# Patient Record
Sex: MALE | Race: WHITE | NOT HISPANIC OR LATINO | URBAN - METROPOLITAN AREA
[De-identification: names, ages, dates, MRNs, and addresses within clinical notes are randomized per-mention and may not be internally consistent; named-entity substitution may affect disease eponyms.]

---

## 2017-03-28 ENCOUNTER — OUTPATIENT (OUTPATIENT)
Dept: OUTPATIENT SERVICES | Facility: HOSPITAL | Age: 77
LOS: 1 days | End: 2017-03-28
Payer: MEDICARE

## 2017-03-28 VITALS
DIASTOLIC BLOOD PRESSURE: 74 MMHG | HEART RATE: 67 BPM | TEMPERATURE: 98 F | SYSTOLIC BLOOD PRESSURE: 139 MMHG | OXYGEN SATURATION: 96 % | RESPIRATION RATE: 18 BRPM

## 2017-03-28 VITALS — HEIGHT: 71 IN | WEIGHT: 199.08 LBS

## 2017-03-28 DIAGNOSIS — Z95.1 PRESENCE OF AORTOCORONARY BYPASS GRAFT: Chronic | ICD-10-CM

## 2017-03-28 DIAGNOSIS — I25.10 ATHEROSCLEROTIC HEART DISEASE OF NATIVE CORONARY ARTERY WITHOUT ANGINA PECTORIS: Chronic | ICD-10-CM

## 2017-03-28 DIAGNOSIS — Z01.810 ENCOUNTER FOR PREPROCEDURAL CARDIOVASCULAR EXAMINATION: ICD-10-CM

## 2017-03-28 LAB
ALBUMIN SERPL ELPH-MCNC: 3.8 G/DL — SIGNIFICANT CHANGE UP (ref 3.3–5.2)
ALP SERPL-CCNC: 62 U/L — SIGNIFICANT CHANGE UP (ref 40–120)
ALT FLD-CCNC: 10 U/L — SIGNIFICANT CHANGE UP
ANION GAP SERPL CALC-SCNC: 11 MMOL/L — SIGNIFICANT CHANGE UP (ref 5–17)
APTT BLD: 33.8 SEC — SIGNIFICANT CHANGE UP (ref 27.5–37.4)
AST SERPL-CCNC: 12 U/L — SIGNIFICANT CHANGE UP
BASOPHILS # BLD AUTO: 0 K/UL — SIGNIFICANT CHANGE UP (ref 0–0.2)
BASOPHILS NFR BLD AUTO: 0.1 % — SIGNIFICANT CHANGE UP (ref 0–2)
BILIRUB SERPL-MCNC: 0.4 MG/DL — SIGNIFICANT CHANGE UP (ref 0.4–2)
BUN SERPL-MCNC: 18 MG/DL — SIGNIFICANT CHANGE UP (ref 8–20)
CALCIUM SERPL-MCNC: 9 MG/DL — SIGNIFICANT CHANGE UP (ref 8.6–10.2)
CHLORIDE SERPL-SCNC: 102 MMOL/L — SIGNIFICANT CHANGE UP (ref 98–107)
CO2 SERPL-SCNC: 26 MMOL/L — SIGNIFICANT CHANGE UP (ref 22–29)
CREAT SERPL-MCNC: 1.25 MG/DL — SIGNIFICANT CHANGE UP (ref 0.5–1.3)
EOSINOPHIL # BLD AUTO: 0.2 K/UL — SIGNIFICANT CHANGE UP (ref 0–0.5)
EOSINOPHIL NFR BLD AUTO: 3.6 % — SIGNIFICANT CHANGE UP (ref 0–5)
GLUCOSE SERPL-MCNC: 81 MG/DL — SIGNIFICANT CHANGE UP (ref 70–115)
HCT VFR BLD CALC: 36.8 % — LOW (ref 42–52)
HGB BLD-MCNC: 11.1 G/DL — LOW (ref 14–18)
INR BLD: 1.04 RATIO — SIGNIFICANT CHANGE UP (ref 0.88–1.16)
LYMPHOCYTES # BLD AUTO: 2.4 K/UL — SIGNIFICANT CHANGE UP (ref 1–4.8)
LYMPHOCYTES # BLD AUTO: 34 % — SIGNIFICANT CHANGE UP (ref 20–55)
MAGNESIUM SERPL-MCNC: 1.8 MG/DL — SIGNIFICANT CHANGE UP (ref 1.8–2.5)
MCHC RBC-ENTMCNC: 23.5 PG — LOW (ref 27–31)
MCHC RBC-ENTMCNC: 30.2 G/DL — LOW (ref 32–36)
MCV RBC AUTO: 78 FL — LOW (ref 80–94)
MONOCYTES # BLD AUTO: 0.5 K/UL — SIGNIFICANT CHANGE UP (ref 0–0.8)
MONOCYTES NFR BLD AUTO: 7.8 % — SIGNIFICANT CHANGE UP (ref 3–10)
NEUTROPHILS # BLD AUTO: 3.8 K/UL — SIGNIFICANT CHANGE UP (ref 1.8–8)
NEUTROPHILS NFR BLD AUTO: 54.2 % — SIGNIFICANT CHANGE UP (ref 37–73)
PLATELET # BLD AUTO: 249 K/UL — SIGNIFICANT CHANGE UP (ref 150–400)
POTASSIUM SERPL-MCNC: 3.6 MMOL/L — SIGNIFICANT CHANGE UP (ref 3.5–5.3)
POTASSIUM SERPL-SCNC: 3.6 MMOL/L — SIGNIFICANT CHANGE UP (ref 3.5–5.3)
PROT SERPL-MCNC: 7.3 G/DL — SIGNIFICANT CHANGE UP (ref 6.6–8.7)
PROTHROM AB SERPL-ACNC: 11.5 SEC — SIGNIFICANT CHANGE UP (ref 9.8–12.7)
RBC # BLD: 4.72 M/UL — SIGNIFICANT CHANGE UP (ref 4.6–6.2)
RBC # FLD: 16.3 % — HIGH (ref 11–15.6)
SODIUM SERPL-SCNC: 139 MMOL/L — SIGNIFICANT CHANGE UP (ref 135–145)
WBC # BLD: 7 K/UL — SIGNIFICANT CHANGE UP (ref 4.8–10.8)
WBC # FLD AUTO: 7 K/UL — SIGNIFICANT CHANGE UP (ref 4.8–10.8)

## 2017-03-28 PROCEDURE — 93010 ELECTROCARDIOGRAM REPORT: CPT

## 2017-03-28 NOTE — H&P PST ADULT - PSH
CAD S/P percutaneous coronary angioplasty    S/P CABG x 3  [facility] sept. 2016 ENG LAD, SVG to circ, SVG to RPDA

## 2017-03-28 NOTE — H&P PST ADULT - ASSESSMENT
77 y/o white male s/p CABG/PCIs now with abnormal NST following new onset SOB.  Multiple risk factors for CV disease

## 2017-03-28 NOTE — ASU PATIENT PROFILE, ADULT - PSH
CAD S/P percutaneous coronary angioplasty CAD S/P percutaneous coronary angioplasty    S/P CABG x 3  [facility] sept. 2016

## 2017-03-28 NOTE — ASU PATIENT PROFILE, ADULT - PMH
Coronary artery disease involving native coronary artery of native heart with angina pectoris    Essential hypertension    Pure hypercholesterolemia Coronary artery disease involving native coronary artery of native heart with angina pectoris    Essential hypertension    Old MI (myocardial infarction)    Pure hypercholesterolemia

## 2017-03-28 NOTE — H&P PST ADULT - LAST STRESS TEST
March 2017  new anterolateral ischemia with small sized, moderate intensity completely reversible defect of the mid anterolateral apical lateral and apical septal wall consistent with ischemia.  There was a small sized moderate intensity fixed defect of the basal inferior basal inferloater and mid inferior wall consistent with MI.  EF 63%. Had PVCs and 3 beat SVT during.

## 2017-03-28 NOTE — H&P PST ADULT - NSANTHOSAYNRD_GEN_A_CORE
No. XIOMARA screening performed.  STOP BANG Legend: 0-2 = LOW Risk; 3-4 = INTERMEDIATE Risk; 5-8 = HIGH Risk

## 2017-03-28 NOTE — H&P PST ADULT - PMH
BPH (benign prostatic hyperplasia)    Congenital kidney disease    Coronary artery disease involving native coronary artery of native heart with angina pectoris  CABG 2016 ENG LAD, SVG TO CIRC, SVG TO RPDA  PCIs  Essential hypertension    Former smoker    Hepatic cyst    Old MI (myocardial infarction)    PAF (paroxysmal atrial fibrillation)    Pure hypercholesterolemia    SOB (shortness of breath)  ANGINAL EQUIVALENT CLASS III

## 2017-03-28 NOTE — H&P PST ADULT - HISTORY OF PRESENT ILLNESS
76 y /o white male presents to [facility] for [facility] to evaluate recent NST following evaluation for recent SOB/fatigue    3/2017 NST showed new anterolateral ischemia with small sized, moderate intensity completely reversible defect of the mid anterolateral apical lateral and apical septal wall consistent with ischemia.  There was a small sized moderate intensity fixed defect of the basal inferior basal inferloater and mid inferior wall consistent with MI.  EF 63%. Had PVCs and 3 beat SVT during.    PMH: CABG 8/2016 LIMA to LAD , SVG to circumflex marginal SVG to RPDA @ [facility]            MI, former smoker, PCIs, BPH, HLD, congenital kidney disorder (pt unusure does not see nephrology), PAF    PCP: Elba  Cardiac: Tadeo/Janna/Brandy 76 y /o white male presents to [facility] for [facility] to evaluate recent NST following evaluation for recent SOB/fatigue    3/2017 NST showed new anterolateral ischemia with small sized, moderate intensity completely reversible defect of the mid anterolateral apical lateral and apical septal wall consistent with ischemia.  There was a small sized moderate intensity fixed defect of the basal inferior basal inferloater and mid inferior wall consistent with MI.  EF 63%. Had PVCs and 3 beat SVT during. Intermediate risk NST.    PMH: CABG 8/2016 LIMA to LAD , SVG to circumflex marginal SVG to RPDA @ [facility]            MI, former smoker, PCIs, BPH, HLD, congenital kidney disorder (pt unusure does not see nephrology), PAF    PCP: Elba  Cardiac: Tadeo/Janna/Brandy

## 2017-03-29 ENCOUNTER — INPATIENT (INPATIENT)
Facility: HOSPITAL | Age: 77
LOS: 0 days | Discharge: HOME | DRG: 247 | End: 2017-03-30
Attending: INTERNAL MEDICINE | Admitting: INTERNAL MEDICINE
Payer: COMMERCIAL

## 2017-03-29 VITALS
SYSTOLIC BLOOD PRESSURE: 163 MMHG | DIASTOLIC BLOOD PRESSURE: 68 MMHG | HEART RATE: 62 BPM | OXYGEN SATURATION: 98 % | RESPIRATION RATE: 14 BRPM

## 2017-03-29 DIAGNOSIS — Z95.1 PRESENCE OF AORTOCORONARY BYPASS GRAFT: Chronic | ICD-10-CM

## 2017-03-29 DIAGNOSIS — I25.10 ATHEROSCLEROTIC HEART DISEASE OF NATIVE CORONARY ARTERY WITHOUT ANGINA PECTORIS: ICD-10-CM

## 2017-03-29 DIAGNOSIS — Z01.810 ENCOUNTER FOR PREPROCEDURAL CARDIOVASCULAR EXAMINATION: ICD-10-CM

## 2017-03-29 DIAGNOSIS — R94.39 ABNORMAL RESULT OF OTHER CARDIOVASCULAR FUNCTION STUDY: ICD-10-CM

## 2017-03-29 DIAGNOSIS — I25.10 ATHEROSCLEROTIC HEART DISEASE OF NATIVE CORONARY ARTERY WITHOUT ANGINA PECTORIS: Chronic | ICD-10-CM

## 2017-03-29 PROCEDURE — 92928 PRQ TCAT PLMT NTRAC ST 1 LES: CPT | Mod: LM

## 2017-03-29 PROCEDURE — 92978 ENDOLUMINL IVUS OCT C 1ST: CPT | Mod: 26

## 2017-03-29 PROCEDURE — 93459 L HRT ART/GRFT ANGIO: CPT | Mod: 26,XU

## 2017-03-29 PROCEDURE — 93567 NJX CAR CTH SPRVLV AORTGRPHY: CPT

## 2017-03-29 RX ORDER — CLOPIDOGREL BISULFATE 75 MG/1
75 TABLET, FILM COATED ORAL DAILY
Qty: 0 | Refills: 0 | Status: DISCONTINUED | OUTPATIENT
Start: 2017-03-31 | End: 2017-03-30

## 2017-03-29 RX ORDER — HYDRALAZINE HCL 50 MG
5 TABLET ORAL ONCE
Qty: 0 | Refills: 0 | Status: COMPLETED | OUTPATIENT
Start: 2017-03-29 | End: 2017-03-29

## 2017-03-29 RX ORDER — METOPROLOL TARTRATE 50 MG
50 TABLET ORAL
Qty: 0 | Refills: 0 | Status: DISCONTINUED | OUTPATIENT
Start: 2017-03-29 | End: 2017-03-30

## 2017-03-29 RX ORDER — CLOPIDOGREL BISULFATE 75 MG/1
600 TABLET, FILM COATED ORAL ONCE
Qty: 0 | Refills: 0 | Status: COMPLETED | OUTPATIENT
Start: 2017-03-30 | End: 2017-03-30

## 2017-03-29 RX ORDER — FENTANYL CITRATE 50 UG/ML
25 INJECTION INTRAVENOUS ONCE
Qty: 0 | Refills: 0 | Status: DISCONTINUED | OUTPATIENT
Start: 2017-03-29 | End: 2017-03-29

## 2017-03-29 RX ORDER — SODIUM CHLORIDE 9 MG/ML
500 INJECTION INTRAMUSCULAR; INTRAVENOUS; SUBCUTANEOUS
Qty: 0 | Refills: 0 | Status: DISCONTINUED | OUTPATIENT
Start: 2017-03-29 | End: 2017-03-29

## 2017-03-29 RX ORDER — ALPRAZOLAM 0.25 MG
0.25 TABLET ORAL EVERY 6 HOURS
Qty: 0 | Refills: 0 | Status: DISCONTINUED | OUTPATIENT
Start: 2017-03-29 | End: 2017-03-30

## 2017-03-29 RX ORDER — ACETAMINOPHEN 500 MG
650 TABLET ORAL EVERY 6 HOURS
Qty: 0 | Refills: 0 | Status: DISCONTINUED | OUTPATIENT
Start: 2017-03-29 | End: 2017-03-30

## 2017-03-29 RX ORDER — ONDANSETRON 8 MG/1
4 TABLET, FILM COATED ORAL EVERY 8 HOURS
Qty: 0 | Refills: 0 | Status: DISCONTINUED | OUTPATIENT
Start: 2017-03-29 | End: 2017-03-30

## 2017-03-29 RX ORDER — ASPIRIN/CALCIUM CARB/MAGNESIUM 324 MG
81 TABLET ORAL DAILY
Qty: 0 | Refills: 0 | Status: DISCONTINUED | OUTPATIENT
Start: 2017-03-29 | End: 2017-03-30

## 2017-03-29 RX ORDER — FUROSEMIDE 40 MG
20 TABLET ORAL ONCE
Qty: 0 | Refills: 0 | Status: COMPLETED | OUTPATIENT
Start: 2017-03-29 | End: 2017-03-29

## 2017-03-29 RX ORDER — SODIUM CHLORIDE 9 MG/ML
500 INJECTION INTRAMUSCULAR; INTRAVENOUS; SUBCUTANEOUS
Qty: 0 | Refills: 0 | Status: DISCONTINUED | OUTPATIENT
Start: 2017-03-29 | End: 2017-03-30

## 2017-03-29 RX ORDER — ATORVASTATIN CALCIUM 80 MG/1
40 TABLET, FILM COATED ORAL AT BEDTIME
Qty: 0 | Refills: 0 | Status: DISCONTINUED | OUTPATIENT
Start: 2017-03-29 | End: 2017-03-30

## 2017-03-29 RX ORDER — ASPIRIN/CALCIUM CARB/MAGNESIUM 324 MG
325 TABLET ORAL ONCE
Qty: 0 | Refills: 0 | Status: COMPLETED | OUTPATIENT
Start: 2017-03-29 | End: 2017-03-29

## 2017-03-29 RX ORDER — TAMSULOSIN HYDROCHLORIDE 0.4 MG/1
0.4 CAPSULE ORAL AT BEDTIME
Qty: 0 | Refills: 0 | Status: DISCONTINUED | OUTPATIENT
Start: 2017-03-29 | End: 2017-03-30

## 2017-03-29 RX ADMIN — Medication 20 MILLIGRAM(S): at 19:45

## 2017-03-29 RX ADMIN — Medication 5 MILLIGRAM(S): at 18:15

## 2017-03-29 RX ADMIN — FENTANYL CITRATE 25 MICROGRAM(S): 50 INJECTION INTRAVENOUS at 20:20

## 2017-03-29 RX ADMIN — Medication 50 MILLIGRAM(S): at 18:36

## 2017-03-29 RX ADMIN — SODIUM CHLORIDE 50 MILLILITER(S): 9 INJECTION INTRAMUSCULAR; INTRAVENOUS; SUBCUTANEOUS at 14:30

## 2017-03-29 RX ADMIN — FENTANYL CITRATE 25 MICROGRAM(S): 50 INJECTION INTRAVENOUS at 20:47

## 2017-03-29 NOTE — DISCHARGE NOTE ADULT - MEDICATION SUMMARY - MEDICATIONS TO TAKE
I will START or STAY ON the medications listed below when I get home from the hospital:    aspirin 81 mg oral tablet, chewable  -- 1 tab(s) by mouth once a day  -- Indication: For CAD (coronary artery disease)    tamsulosin 0.4 mg oral capsule  -- 1 cap(s) by mouth once a day (at bedtime)  -- Indication: For bph    atorvastatin 40 mg oral tablet  -- 1 tab(s) by mouth once a day (at bedtime)  -- Indication: For CAD (coronary artery disease)    clopidogrel 75 mg oral tablet  -- 1 tab(s) by mouth once a day  -- Indication: For CAD (coronary artery disease)    metoprolol tartrate 50 mg oral tablet  -- 1 tab(s) by mouth 2 times a day  -- Indication: For htn

## 2017-03-29 NOTE — PROGRESS NOTE ADULT - PROBLEM SELECTOR PLAN 1
Plavix 600 mg po bolus at 1400 3/30 then 75 mg po daily  ASA 81   Continue prior BB/Statin  AM Labs/ECG  F/U Dr. Saldivar outpt  Rt groin instructions to pt.

## 2017-03-29 NOTE — DISCHARGE NOTE ADULT - CARE PLAN
Principal Discharge DX:	CAD (coronary artery disease)  [facility]  Instructions for follow-up, activity and diet:	as tolerated Principal Discharge DX:	CAD (coronary artery disease)  [facility]  Instructions for follow-up, activity and diet:	as tolerated Principal Discharge DX:	CAD (coronary artery disease)  [facility]  Instructions for follow-up, activity and diet:	as tolerated

## 2017-03-29 NOTE — PROGRESS NOTE ADULT - SUBJECTIVE AND OBJECTIVE BOX
s/p [facility] RFA #6 w/ DANITA LM to circ of SVG to OM occlusion  Tolerated procedure well  Seen post procedure by Dr. Saldivar w/family present      REVIEW OF SYSTEMS:  Denies SOB, CP, NV, HA, dizziness, palpitations, site pain    PHYSICAL EXAM: A&Ox3 NAD Skin warm and dry  NEURO: Speech intact +gag +swallow Tongue midline MACHUCA  NECK: No JVD, trachea midline. Eupneic  HEART: RRR S1 S2 no g/m Tele/ECG Sr  PULMONARY:  CTA coleman  ABDOMEN: Soft nontender X4 +BS Vdg/eating  EXTREMITIES:RFA site: No bleed, hematoma, pain, ecchymosis or swelling Rt DP/PT+

## 2017-03-29 NOTE — DISCHARGE NOTE ADULT - NS AS ACTIVITY OBS
Do not drive or operate machinery/Do not make important decisions/No Heavy lifting/straining/Showering allowed

## 2017-03-29 NOTE — DISCHARGE NOTE ADULT - PATIENT PORTAL LINK FT
“You can access the [facility] Patient Portal, offered by [facility], by registering with the following website: [email or web address]”

## 2017-03-29 NOTE — DISCHARGE NOTE ADULT - MEDICATION SUMMARY - MEDICATIONS TO CHANGE
I will SWITCH the dose or number of times a day I take the medications listed below when I get home from the hospital:    aspirin 325 mg oral tablet  -- 1 tab(s) by mouth once a day

## 2017-03-29 NOTE — DISCHARGE NOTE ADULT - CARE PROVIDER_API CALL
Paul Saldivar), Cardiovascular Disease; Internal Medicine; Interventional Cardiology  [address] [place] [address]  Phone: [phone]  Fax: [phone] Allen Piedra (MD), Cardiovascular Disease  [address] [place] [address]  Phone: [phone]  Fax: [phone]

## 2017-03-29 NOTE — DISCHARGE NOTE ADULT - HOSPITAL COURSE
76 y /o white male presents to [facility] for [facility] to evaluate recent NST following evaluation for recent SOB/fatigue        A/P impression:  Patent lima to lad, svt to rca.  Occluded svt to circumflex.  PCI performed to LM into circumflex    1- follow up outpatient with MD Saldivar 1 week  add Plavix 75 dialy and aspirin 81mb po daily   explain aggressive life style modifications in detail  continue all other home medications. 76 y /o white male presents to [facility] for [facility] to evaluate recent NST following evaluation for recent SOB/fatigue        A/P impression:  Patent lima to lad, svt to rca.  Occluded svt to circumflex.  PCI performed to LM into circumflex    1- follow up outpatient with MD Saldivar 1 week  Start Plavix today at 2pm, patient informed   add Plavix 75 daily and aspirin 81mb po daily   explain aggressive life style modifications in detail  continue all other home medications.

## 2017-03-29 NOTE — PROGRESS NOTE ADULT - SUBJECTIVE AND OBJECTIVE BOX
s/p #6RFA regla aspetically removed intact with adequate hemostatsis after 25 min hold with DSTAT Fentanyl 25 mcg for comfort  RFA site: No bleed, hematoma, pain, ecchymosis or swelling Rt DP/PT+

## 2017-03-29 NOTE — DISCHARGE NOTE ADULT - CARE PROVIDERS DIRECT ADDRESSES
[email or web address]

## 2017-03-30 VITALS
RESPIRATION RATE: 19 BRPM | DIASTOLIC BLOOD PRESSURE: 64 MMHG | TEMPERATURE: 98 F | HEART RATE: 68 BPM | SYSTOLIC BLOOD PRESSURE: 142 MMHG | OXYGEN SATURATION: 100 %

## 2017-03-30 LAB
ANION GAP SERPL CALC-SCNC: 13 MMOL/L — SIGNIFICANT CHANGE UP (ref 5–17)
BUN SERPL-MCNC: 18 MG/DL — SIGNIFICANT CHANGE UP (ref 8–20)
CALCIUM SERPL-MCNC: 9.5 MG/DL — SIGNIFICANT CHANGE UP (ref 8.6–10.2)
CHLORIDE SERPL-SCNC: 106 MMOL/L — SIGNIFICANT CHANGE UP (ref 98–107)
CO2 SERPL-SCNC: 25 MMOL/L — SIGNIFICANT CHANGE UP (ref 22–29)
CREAT SERPL-MCNC: 1.2 MG/DL — SIGNIFICANT CHANGE UP (ref 0.5–1.3)
GLUCOSE SERPL-MCNC: 85 MG/DL — SIGNIFICANT CHANGE UP (ref 70–115)
HCT VFR BLD CALC: 37.8 % — LOW (ref 42–52)
HGB BLD-MCNC: 12 G/DL — LOW (ref 14–18)
MAGNESIUM SERPL-MCNC: 2.3 MG/DL — SIGNIFICANT CHANGE UP (ref 1.8–2.5)
MCHC RBC-ENTMCNC: 24.1 PG — LOW (ref 27–31)
MCHC RBC-ENTMCNC: 31.7 G/DL — LOW (ref 32–36)
MCV RBC AUTO: 75.9 FL — LOW (ref 80–94)
PLATELET # BLD AUTO: 284 K/UL — SIGNIFICANT CHANGE UP (ref 150–400)
POTASSIUM SERPL-MCNC: 3.9 MMOL/L — SIGNIFICANT CHANGE UP (ref 3.5–5.3)
POTASSIUM SERPL-SCNC: 3.9 MMOL/L — SIGNIFICANT CHANGE UP (ref 3.5–5.3)
RBC # BLD: 4.98 M/UL — SIGNIFICANT CHANGE UP (ref 4.6–6.2)
RBC # FLD: 16.8 % — HIGH (ref 11–15.6)
SODIUM SERPL-SCNC: 144 MMOL/L — SIGNIFICANT CHANGE UP (ref 135–145)
WBC # BLD: 9.4 K/UL — SIGNIFICANT CHANGE UP (ref 4.8–10.8)
WBC # FLD AUTO: 9.4 K/UL — SIGNIFICANT CHANGE UP (ref 4.8–10.8)

## 2017-03-30 PROCEDURE — C1894: CPT

## 2017-03-30 PROCEDURE — G0463: CPT

## 2017-03-30 PROCEDURE — C1769: CPT

## 2017-03-30 PROCEDURE — 85730 THROMBOPLASTIN TIME PARTIAL: CPT

## 2017-03-30 PROCEDURE — 93010 ELECTROCARDIOGRAM REPORT: CPT

## 2017-03-30 PROCEDURE — 93459 L HRT ART/GRFT ANGIO: CPT | Mod: XU

## 2017-03-30 PROCEDURE — 93567 NJX CAR CTH SPRVLV AORTGRPHY: CPT

## 2017-03-30 PROCEDURE — 36415 COLL VENOUS BLD VENIPUNCTURE: CPT

## 2017-03-30 PROCEDURE — 80053 COMPREHEN METABOLIC PANEL: CPT

## 2017-03-30 PROCEDURE — C1725: CPT

## 2017-03-30 PROCEDURE — 85027 COMPLETE CBC AUTOMATED: CPT

## 2017-03-30 PROCEDURE — 83735 ASSAY OF MAGNESIUM: CPT

## 2017-03-30 PROCEDURE — 93005 ELECTROCARDIOGRAM TRACING: CPT

## 2017-03-30 PROCEDURE — 80048 BASIC METABOLIC PNL TOTAL CA: CPT

## 2017-03-30 PROCEDURE — C1753: CPT

## 2017-03-30 PROCEDURE — C9600: CPT | Mod: LM

## 2017-03-30 PROCEDURE — 85610 PROTHROMBIN TIME: CPT

## 2017-03-30 PROCEDURE — C1887: CPT

## 2017-03-30 PROCEDURE — C1874: CPT

## 2017-03-30 RX ORDER — CLOPIDOGREL BISULFATE 75 MG/1
1 TABLET, FILM COATED ORAL
Qty: 90 | Refills: 0 | OUTPATIENT
Start: 2017-03-30

## 2017-03-30 RX ORDER — ASPIRIN/CALCIUM CARB/MAGNESIUM 324 MG
1 TABLET ORAL
Qty: 90 | Refills: 0 | OUTPATIENT
Start: 2017-03-30

## 2017-03-30 RX ADMIN — Medication 81 MILLIGRAM(S): at 11:23

## 2017-03-30 RX ADMIN — CLOPIDOGREL BISULFATE 600 MILLIGRAM(S): 75 TABLET, FILM COATED ORAL at 01:48

## 2017-03-30 RX ADMIN — ATORVASTATIN CALCIUM 40 MILLIGRAM(S): 80 TABLET, FILM COATED ORAL at 01:04

## 2017-03-30 RX ADMIN — Medication 0.25 MILLIGRAM(S): at 04:43

## 2017-03-30 RX ADMIN — TAMSULOSIN HYDROCHLORIDE 0.4 MILLIGRAM(S): 0.4 CAPSULE ORAL at 01:03

## 2017-03-30 RX ADMIN — Medication 50 MILLIGRAM(S): at 05:10

## 2017-03-30 NOTE — PROGRESS NOTE ADULT - SUBJECTIVE AND OBJECTIVE BOX
[facility]  [facility]      History of Present Illness:  		  76 y /o white male presents to [facility] for [facility] to evaluate recent NST following evaluation for recent SOB/fatigue    3/2017 NST showed new anterolateral ischemia with small sized, moderate intensity completely reversible defect of the mid anterolateral apical lateral and apical septal wall consistent with ischemia.  There was a small sized moderate intensity fixed defect of the basal inferior basal inferloater and mid inferior wall consistent with MI.  EF 63%. Had PVCs and 3 beat SVT during. Intermediate risk NST.    PMH: CABG 2016 LIMA to LAD , SVG to circumflex marginal SVG to RPDA @ [facility]            MI, former smoker, PCIs, BPH, HLD, congenital kidney disorder (pt unusure does not see nephrology), PAF    PCP: Elba  Cardiac: Tadeo/Janna/Brandy    Underwent cardiac cath yesterday:  EF 55 %  right dominant  LM 80% stenosis  LAD - osital lad 7-% stenosis  CX proximal 40% stenosis  Grafts to lad, lima no disease  Graft to circumflex was saphenous occluded  Graft to the RPDA [id number] vein graft n disease        Pre-operative cardiovascular examination  Other nonspecific abnormal cardiovascular system function study  H/o or current diagnosis of HF- ACEI/ARB contraindication unknown  H/o or current diagnosis of HF- Contraindication to ACEI/ARBs  H/o or current diagnosis of HF- ACEI/ARB contraindication unknown  Handoff  Hepatic cyst  Former smoker  PAF (paroxysmal atrial fibrillati  BPH (benign prostatic hyperplasia)  Congenital kidney disease  SOB (shortness of breath)  Old MI (myocardial infarction)  Coronary artery disease involving native coronary artery of native heart with angina pectoris  Pure hypercholesterolemia  Essential hypertension  CAD (coronary artery disease)  S/P CABG x 3  CAD S/P percutaneous coronary angioplasty      REVIEW OF SYSTEMS    General: Denies fever, chills, pain, no discomfort  	  Respiratory and Thorax:  Denies cough, sob, or any discomfort  	  Cardiovascular:  Denies chest pain, palpitations or any discomfort	    Gastrointestinal:  Denies n/v/d, constipation, or any discomfort    Genitourinary:  Denies frequency, burning, or pain    Musculoskeletal:  Denies joint pain, swelling, or any discomfort     Neurological:  Denies headache, dizziness blurred vision, numbing or tingling    Psychiatric:  Denies sadness or depression    Hematology  Antione bleeding o swelling    Allergic/Immunologic:	  MEDICATIONS:  tamsulosin 0.4milliGRAM(s) Oral at bedtime  metoprolol 50milliGRAM(s) Oral two times a day  acetaminophen   Tablet. 650milliGRAM(s) Oral every 6 hours PRN  ondansetron Injectable 4milliGRAM(s) IV Push every 8 hours PRN  ALPRAZolam 0.25milliGRAM(s) Oral every 6 hours PRN  aluminum hydroxide/magnesium hydroxide/simethicone Suspension 30milliLiter(s) Oral every 4 hours PRN  atorvastatin 40milliGRAM(s) Oral at bedtime  aspirin  chewable 81milliGRAM(s) Oral daily  sodium chloride 0.9%. 500milliLiter(s) IV Continuous <Continuous>        PHYSICAL EXAM:    T(C): 36.4, Max: 36.5 (-30 @ 00:08)  HR: 73 (58 - 73)  BP: 162/80 (138/70 - 203/84)  RR: 20 (14 - 20)  SpO2: 100% (95% - 100%)  Wt(kg): --    I&O's Summary    I & Os for current day (as of 30 Mar 2017 10:21)  =============================================  IN: 300 ml / OUT: 510 ml / NET: -210 ml      Daily     Daily Weight in k.8 (30 Mar 2017 05:29)    Appearance: Normal	  HEENT:   Normal oral mucosa, PERRL, EOMI	  Lymphatic: No lymphadenopathy  Cardiovascular: Normal S1 S2, No JVD, No murmurs, No edema  Respiratory: Lungs clear to auscultation	  Psychiatry: A & O x 3, Mood & affect appropriate  Gastrointestinal:  Soft, Non-tender, + BS	  Skin: No rashes, No ecchymoses, No cyanosis  Neurologic: Non-focal  Extremities: Normal range of motion, No clubbing, cyanosis or edema  Vascular: Peripheral pulses palpable 2+ bilaterally  Procedure Site:        TELEMETRY: 	 Sr  ECG:  Sr, inferior infart, and t wave inversion v4-v6, ! and 2.  D.w MD Saldivar.  	             12.0   9.4   )-----------( 284      ( 30 Mar 2017 07:58 )             37.8     30 Mar 2017 07:58    144    |  106    |  18.0   ----------------------------<  85     3.9     |  25.0   |  1.20     Ca    9.5        30 Mar 2017 07:58  Mg     2.3       30 Mar 2017 07:58    A/P impression:  Patent lima to lad, svt to rca.  Occluded svt to circumflex.  PCI performed to LM into circumflex    1- follow up outpatient with MD Saldivar 1 week  add Plavix 75 dialy and aspirin 81mb po daily   explain aggressive life style modifications in detail  continue all other home medications. [place] Complaint:  Chest pain      History of Present Illness:  		  76 y /o white male presents to [facility] for [facility] to evaluate recent NST following evaluation for recent SOB/fatigue    3/2017 NST showed new anterolateral ischemia with small sized, moderate intensity completely reversible defect of the mid anterolateral apical lateral and apical septal wall consistent with ischemia.  There was a small sized moderate intensity fixed defect of the basal inferior basal inferloater and mid inferior wall consistent with MI.  EF 63%. Had PVCs and 3 beat SVT during. Intermediate risk NST.  Slight episode of sob this am, seen by NP and fully resolved.  Currently comfortable    PMH: CABG 2016 LIMA to LAD , SVG to circumflex marginal SVG to RPDA @ [facility]            MI, former smoker, PCIs, BPH, HLD, congenital kidney disorder (pt unusure does not see nephrology), PAF    PCP: Parasmo  Cardiac: Tadeo/Janna/Brandy    Underwent cardiac cath yesterday:  EF 55 %  right dominant  LM 80% stenosis  LAD - osital lad 7-% stenosis  CX proximal 40% stenosis  Grafts to lad, lima no disease  Graft to circumflex was saphenous occluded  Graft to the RPDA [id number] vein graft n disease        Pre-operative cardiovascular examination  Other nonspecific abnormal cardiovascular system function study  H/o or current diagnosis of HF- ACEI/ARB contraindication unknown  H/o or current diagnosis of HF- Contraindication to ACEI/ARBs  H/o or current diagnosis of HF- ACEI/ARB contraindication unknown  Handoff  Hepatic cyst  Former smoker  PAF (paroxysmal atrial fibrillati  BPH (benign prostatic hyperplasia)  Congenital kidney disease  SOB (shortness of breath)  Old MI (myocardial infarction)  Coronary artery disease involving native coronary artery of native heart with angina pectoris  Pure hypercholesterolemia  Essential hypertension  CAD (coronary artery disease)  S/P CABG x 3  CAD S/P percutaneous coronary angioplasty      REVIEW OF SYSTEMS    General: Denies fever, chills, pain, no discomfort  	  Respiratory and Thorax:  Denies cough, sob, or any discomfort  	  Cardiovascular:  Denies chest pain, palpitations or any discomfort	    Gastrointestinal:  Denies n/v/d, constipation, or any discomfort    Genitourinary:  Denies frequency, burning, or pain    Musculoskeletal:  Denies joint pain, swelling, or any discomfort     Neurological:  Denies headache, dizziness blurred vision, numbing or tingling    PsycIATRIC:  denies sadness, + for anxiety, same as at home.      Hematology  Antione bleeding o swelling    Allergic/Immunologic:	  MEDICATIONS:  tamsulosin 0.4milliGRAM(s) Oral at bedtime  metoprolol 50milliGRAM(s) Oral two times a day  acetaminophen   Tablet. 650milliGRAM(s) Oral every 6 hours PRN  ondansetron Injectable 4milliGRAM(s) IV Push every 8 hours PRN  ALPRAZolam 0.25milliGRAM(s) Oral every 6 hours PRN  aluminum hydroxide/magnesium hydroxide/simethicone Suspension 30milliLiter(s) Oral every 4 hours PRN  atorvastatin 40milliGRAM(s) Oral at bedtime  aspirin  chewable 81milliGRAM(s) Oral daily  sodium chloride 0.9%. 500milliLiter(s) IV Continuous <Continuous>        PHYSICAL EXAM:    T(C): 36.4, Max: 36.5 (- @ 00:08)  HR: 73 (58 - 73)  BP: 162/80 (138/70 - 203/84)  RR: 20 (14 - 20)  SpO2: 100% (95% - 100%)  Wt(kg): --    I&O's Summary    I & Os for current day (as of 30 Mar 2017 10:21)  =============================================  IN: 300 ml / OUT: 510 ml / NET: -210 ml      Daily     Daily Weight in k.8 (30 Mar 2017 05:29)    Appearance: Normal	  HEENT:   Normal oral mucosa, PERRL, EOMI	  Lymphatic: No lymphadenopathy  Cardiovascular: Normal S1 S2, No JVD, No murmurs, No edema  Respiratory: Lungs clear to auscultation	  Psychiatry: A & O x 3, Mood & affect appropriate  Gastrointestinal:  Soft, Non-tender, + BS	  Skin: No rashes, No ecchymoses, No cyanosis  Neurologic: Non-focal  Extremities: Normal range of motion, No clubbing, cyanosis or edema  Vascular: Peripheral pulses palpable 2+ bilaterally  Procedure Site:        TELEMETRY: 	 Sr  ECG:  Sr, inferior infart, and t wave inversion v4-v6, ! and 2.  D.w MD Saldivar.  	             12.0   9.4   )-----------( 284      ( 30 Mar 2017 07:58 )             37.8     30 Mar 2017 07:58    144    |  106    |  18.0   ----------------------------<  85     3.9     |  25.0   |  1.20     Ca    9.5        30 Mar 2017 07:58  Mg     2.3       30 Mar 2017 07:58    A/P impression:  Patent lima to lad, svt to rca.  Occluded svt to circumflex.  PCI performed to LM into circumflex    1- follow up outpatient with MD Saldivar 1 week  add Plavix 75 dialy and aspirin 81mb po daily   explain aggressive life style modifications in detail  continue all other home medications.

## 2017-11-22 NOTE — ASU PATIENT PROFILE, ADULT - AS SC BRADEN MOISTURE
PRE-SEDATION ASSESSMENT    CONSENT  Consent for procedure and sedation obtained: Yes    MEDICAL HISTORY  Significant medical/surgical history: No  Past Complications with Sedation/Anesthesia: No  Significant Family History: No  Smoking History: No  Alcohol/Drug abuse: No  Possible Pregnancy (LMP): Not Applicable (Refused.)  Cardiac History: No  Respiratory History: No    PHYSICAL EXAM  Airway Risk History: No previous history  Airway Anatomy : Class II  Heart : Normal  Lungs : Normal  LOC/Mental Status : Normal    OTHER FINDINGS  Reviewed current medications and allergies: Yes  Pertinent lab/diagnostic test reviewed: Yes    SEDATION RISK ASSESSMENT  Risk Status ASA: Class I - Normal, healthy patient  Plan for Sedation: Moderate Sedation  Indications for Procedure/Pre-Procedure Diagnosis and Planned Procedure: GERD  EKG Monitoring: Yes    NARRATIVE FINDINGS      (4) rarely moist

## 2018-01-18 ENCOUNTER — APPOINTMENT (OUTPATIENT)
Dept: CARDIOLOGY | Facility: CLINIC | Age: 78
End: 2018-01-18
Payer: MEDICARE

## 2018-01-18 PROCEDURE — 93306 TTE W/DOPPLER COMPLETE: CPT

## 2018-01-31 ENCOUNTER — RECORD ABSTRACTING (OUTPATIENT)
Age: 78
End: 2018-01-31

## 2018-01-31 DIAGNOSIS — R94.39 ABNORMAL RESULT OF OTHER CARDIOVASCULAR FUNCTION STUDY: ICD-10-CM

## 2018-01-31 DIAGNOSIS — I25.2 OLD MYOCARDIAL INFARCTION: ICD-10-CM

## 2018-03-02 ENCOUNTER — APPOINTMENT (OUTPATIENT)
Dept: CARDIOLOGY | Facility: CLINIC | Age: 78
End: 2018-03-02
Payer: MEDICARE

## 2018-03-02 VITALS
DIASTOLIC BLOOD PRESSURE: 70 MMHG | BODY MASS INDEX: 29.4 KG/M2 | WEIGHT: 210 LBS | RESPIRATION RATE: 12 BRPM | SYSTOLIC BLOOD PRESSURE: 150 MMHG | HEIGHT: 71 IN | HEART RATE: 62 BPM

## 2018-03-02 DIAGNOSIS — Z78.9 OTHER SPECIFIED HEALTH STATUS: ICD-10-CM

## 2018-03-02 DIAGNOSIS — Z80.3 FAMILY HISTORY OF MALIGNANT NEOPLASM OF BREAST: ICD-10-CM

## 2018-03-02 PROCEDURE — 99214 OFFICE O/P EST MOD 30 MIN: CPT

## 2018-03-02 PROCEDURE — 93000 ELECTROCARDIOGRAM COMPLETE: CPT

## 2018-04-03 ENCOUNTER — RX RENEWAL (OUTPATIENT)
Age: 78
End: 2018-04-03

## 2018-04-04 ENCOUNTER — MEDICATION RENEWAL (OUTPATIENT)
Age: 78
End: 2018-04-04

## 2018-06-07 ENCOUNTER — RX RENEWAL (OUTPATIENT)
Age: 78
End: 2018-06-07

## 2018-06-11 ENCOUNTER — MEDICATION RENEWAL (OUTPATIENT)
Age: 78
End: 2018-06-11

## 2018-07-02 ENCOUNTER — APPOINTMENT (OUTPATIENT)
Dept: CARDIOLOGY | Facility: CLINIC | Age: 78
End: 2018-07-02
Payer: MEDICARE

## 2018-07-02 PROCEDURE — 93880 EXTRACRANIAL BILAT STUDY: CPT

## 2018-07-16 PROBLEM — R06.02 SHORTNESS OF BREATH: Chronic | Status: ACTIVE | Noted: 2017-03-28

## 2018-07-18 ENCOUNTER — APPOINTMENT (OUTPATIENT)
Dept: CARDIOLOGY | Facility: CLINIC | Age: 78
End: 2018-07-18
Payer: MEDICARE

## 2018-07-18 PROCEDURE — 78452 HT MUSCLE IMAGE SPECT MULT: CPT

## 2018-07-18 PROCEDURE — A9500: CPT

## 2018-07-18 PROCEDURE — 93015 CV STRESS TEST SUPVJ I&R: CPT

## 2018-07-18 RX ORDER — KIT FOR THE PREPARATION OF TECHNETIUM TC99M SESTAMIBI 1 MG/5ML
INJECTION, POWDER, LYOPHILIZED, FOR SOLUTION PARENTERAL
Refills: 0 | Status: COMPLETED | OUTPATIENT
Start: 2018-07-18

## 2018-07-18 RX ADMIN — KIT FOR THE PREPARATION OF TECHNETIUM TC99M SESTAMIBI 0: 1 INJECTION, POWDER, LYOPHILIZED, FOR SOLUTION PARENTERAL at 00:00

## 2018-08-13 ENCOUNTER — APPOINTMENT (OUTPATIENT)
Dept: CARDIOLOGY | Facility: CLINIC | Age: 78
End: 2018-08-13
Payer: MEDICARE

## 2018-08-13 VITALS
HEART RATE: 76 BPM | BODY MASS INDEX: 29.68 KG/M2 | DIASTOLIC BLOOD PRESSURE: 65 MMHG | WEIGHT: 212 LBS | SYSTOLIC BLOOD PRESSURE: 150 MMHG | RESPIRATION RATE: 18 BRPM | HEIGHT: 71 IN

## 2018-08-13 PROBLEM — K76.89 OTHER SPECIFIED DISEASES OF LIVER: Chronic | Status: ACTIVE | Noted: 2017-03-28

## 2018-08-13 PROBLEM — Z87.891 PERSONAL HISTORY OF NICOTINE DEPENDENCE: Chronic | Status: ACTIVE | Noted: 2017-03-28

## 2018-08-13 PROBLEM — N40.0 BENIGN PROSTATIC HYPERPLASIA WITHOUT LOWER URINARY TRACT SYMPTOMS: Chronic | Status: ACTIVE | Noted: 2017-03-28

## 2018-08-13 PROBLEM — N28.9 DISORDER OF KIDNEY AND URETER, UNSPECIFIED: Chronic | Status: ACTIVE | Noted: 2017-03-28

## 2018-08-13 PROBLEM — I48.0 PAROXYSMAL ATRIAL FIBRILLATION: Chronic | Status: ACTIVE | Noted: 2017-03-28

## 2018-08-13 PROBLEM — I25.2 OLD MYOCARDIAL INFARCTION: Chronic | Status: ACTIVE | Noted: 2017-03-28

## 2018-08-13 PROCEDURE — 93000 ELECTROCARDIOGRAM COMPLETE: CPT | Mod: 59

## 2018-08-13 PROCEDURE — 99214 OFFICE O/P EST MOD 30 MIN: CPT

## 2018-08-13 PROCEDURE — 93224 XTRNL ECG REC UP TO 48 HRS: CPT

## 2018-08-13 RX ORDER — CLOPIDOGREL BISULFATE 75 MG/1
75 TABLET, FILM COATED ORAL
Qty: 90 | Refills: 0 | Status: DISCONTINUED | COMMUNITY
Start: 2018-06-07 | End: 2018-08-13

## 2018-08-13 RX ORDER — ASPIRIN ENTERIC COATED TABLETS 81 MG 81 MG/1
81 TABLET, DELAYED RELEASE ORAL
Qty: 90 | Refills: 1 | Status: DISCONTINUED | COMMUNITY
Start: 2018-06-07 | End: 2018-08-13

## 2018-08-16 ENCOUNTER — APPOINTMENT (OUTPATIENT)
Dept: CARDIOLOGY | Facility: CLINIC | Age: 78
End: 2018-08-16

## 2018-08-20 ENCOUNTER — OUTPATIENT (OUTPATIENT)
Dept: OUTPATIENT SERVICES | Facility: HOSPITAL | Age: 78
LOS: 1 days | End: 2018-08-20
Payer: MEDICARE

## 2018-08-20 VITALS
DIASTOLIC BLOOD PRESSURE: 81 MMHG | SYSTOLIC BLOOD PRESSURE: 174 MMHG | HEART RATE: 66 BPM | RESPIRATION RATE: 18 BRPM | OXYGEN SATURATION: 98 %

## 2018-08-20 DIAGNOSIS — I25.119 ATHEROSCLEROTIC HEART DISEASE OF NATIVE CORONARY ARTERY WITH UNSPECIFIED ANGINA PECTORIS: ICD-10-CM

## 2018-08-20 DIAGNOSIS — Z01.810 ENCOUNTER FOR PREPROCEDURAL CARDIOVASCULAR EXAMINATION: ICD-10-CM

## 2018-08-20 DIAGNOSIS — I25.10 ATHEROSCLEROTIC HEART DISEASE OF NATIVE CORONARY ARTERY WITHOUT ANGINA PECTORIS: Chronic | ICD-10-CM

## 2018-08-20 DIAGNOSIS — Z95.1 PRESENCE OF AORTOCORONARY BYPASS GRAFT: Chronic | ICD-10-CM

## 2018-08-20 LAB
ANION GAP SERPL CALC-SCNC: 13 MMOL/L — SIGNIFICANT CHANGE UP (ref 5–17)
ANISOCYTOSIS BLD QL: SLIGHT — SIGNIFICANT CHANGE UP
APTT BLD: 33 SEC — SIGNIFICANT CHANGE UP (ref 27.5–37.4)
BASOPHILS # BLD AUTO: 0 K/UL — SIGNIFICANT CHANGE UP (ref 0–0.2)
BASOPHILS NFR BLD AUTO: 0.3 % — SIGNIFICANT CHANGE UP (ref 0–2)
BUN SERPL-MCNC: 12 MG/DL — SIGNIFICANT CHANGE UP (ref 8–20)
CALCIUM SERPL-MCNC: 8.7 MG/DL — SIGNIFICANT CHANGE UP (ref 8.6–10.2)
CHLORIDE SERPL-SCNC: 104 MMOL/L — SIGNIFICANT CHANGE UP (ref 98–107)
CO2 SERPL-SCNC: 23 MMOL/L — SIGNIFICANT CHANGE UP (ref 22–29)
CREAT SERPL-MCNC: 1.28 MG/DL — SIGNIFICANT CHANGE UP (ref 0.5–1.3)
ELLIPTOCYTES BLD QL SMEAR: SLIGHT — SIGNIFICANT CHANGE UP
EOSINOPHIL # BLD AUTO: 0.2 K/UL — SIGNIFICANT CHANGE UP (ref 0–0.5)
EOSINOPHIL NFR BLD AUTO: 2.8 % — SIGNIFICANT CHANGE UP (ref 0–6)
GLUCOSE SERPL-MCNC: 121 MG/DL — HIGH (ref 70–115)
HCT VFR BLD CALC: 34.1 % — LOW (ref 42–52)
HGB BLD-MCNC: 10 G/DL — LOW (ref 14–18)
HYPOCHROMIA BLD QL: SLIGHT — SIGNIFICANT CHANGE UP
INR BLD: 1.1 RATIO — SIGNIFICANT CHANGE UP (ref 0.88–1.16)
LYMPHOCYTES # BLD AUTO: 1.5 K/UL — SIGNIFICANT CHANGE UP (ref 1–4.8)
LYMPHOCYTES # BLD AUTO: 22.6 % — SIGNIFICANT CHANGE UP (ref 20–55)
MCHC RBC-ENTMCNC: 21.5 PG — LOW (ref 27–31)
MCHC RBC-ENTMCNC: 29.3 G/DL — LOW (ref 32–36)
MCV RBC AUTO: 73.3 FL — LOW (ref 80–94)
MICROCYTES BLD QL: SLIGHT — SIGNIFICANT CHANGE UP
MONOCYTES # BLD AUTO: 0.6 K/UL — SIGNIFICANT CHANGE UP (ref 0–0.8)
MONOCYTES NFR BLD AUTO: 8.4 % — SIGNIFICANT CHANGE UP (ref 3–10)
NEUTROPHILS # BLD AUTO: 4.4 K/UL — SIGNIFICANT CHANGE UP (ref 1.8–8)
NEUTROPHILS NFR BLD AUTO: 65.8 % — SIGNIFICANT CHANGE UP (ref 37–73)
OVALOCYTES BLD QL SMEAR: SLIGHT — SIGNIFICANT CHANGE UP
PLAT MORPH BLD: NORMAL — SIGNIFICANT CHANGE UP
PLATELET # BLD AUTO: 286 K/UL — SIGNIFICANT CHANGE UP (ref 150–400)
POIKILOCYTOSIS BLD QL AUTO: SLIGHT — SIGNIFICANT CHANGE UP
POLYCHROMASIA BLD QL SMEAR: SLIGHT — SIGNIFICANT CHANGE UP
POTASSIUM SERPL-MCNC: 4 MMOL/L — SIGNIFICANT CHANGE UP (ref 3.5–5.3)
POTASSIUM SERPL-SCNC: 4 MMOL/L — SIGNIFICANT CHANGE UP (ref 3.5–5.3)
PROTHROM AB SERPL-ACNC: 12.1 SEC — SIGNIFICANT CHANGE UP (ref 9.8–12.7)
RBC # BLD: 4.65 M/UL — SIGNIFICANT CHANGE UP (ref 4.6–6.2)
RBC # FLD: 16.3 % — HIGH (ref 11–15.6)
RBC BLD AUTO: ABNORMAL
SODIUM SERPL-SCNC: 140 MMOL/L — SIGNIFICANT CHANGE UP (ref 135–145)
WBC # BLD: 6.7 K/UL — SIGNIFICANT CHANGE UP (ref 4.8–10.8)
WBC # FLD AUTO: 6.7 K/UL — SIGNIFICANT CHANGE UP (ref 4.8–10.8)

## 2018-08-20 PROCEDURE — 85730 THROMBOPLASTIN TIME PARTIAL: CPT

## 2018-08-20 PROCEDURE — 36415 COLL VENOUS BLD VENIPUNCTURE: CPT

## 2018-08-20 PROCEDURE — 80048 BASIC METABOLIC PNL TOTAL CA: CPT

## 2018-08-20 PROCEDURE — 93010 ELECTROCARDIOGRAM REPORT: CPT

## 2018-08-20 PROCEDURE — 85610 PROTHROMBIN TIME: CPT

## 2018-08-20 PROCEDURE — G0463: CPT

## 2018-08-20 PROCEDURE — 93005 ELECTROCARDIOGRAM TRACING: CPT

## 2018-08-20 PROCEDURE — 85027 COMPLETE CBC AUTOMATED: CPT

## 2018-08-20 NOTE — H&P PST ADULT - HISTORY OF PRESENT ILLNESS
This is a 77 year old male presents to cardiologist with exertional SOB and fatigue . His history is significant for Claudication , bilateral carotid stenosis  HTN former smoker , HL PAF   CAD with a remote inferior Wall MI and RCA stent , CABG x 3 2016 LCX   graft occlusion, LM stenting in 2017 .   A stress test was done   7/19/18 revealed  moderate intensity reversible defect in mid anteroseptal , apical wall consistent with ischemia , small moderate sized intensity defect in basal mid - inferior posterior wall consistent with infarction , EF 58% with APCs and PVCs during stress testing with exertion la SOB and fatigue     Pt recommended to have a cardiac cath and further evaluation

## 2018-08-20 NOTE — ASU PATIENT PROFILE, ADULT - PSH
CAD S/P percutaneous coronary angioplasty    S/P CABG x 3  [facility] sept. 2016 ENG LAD, SVG to circ, SVG to RPDA

## 2018-08-20 NOTE — H&P PST ADULT - PSH
CAD S/P percutaneous coronary angioplasty    S/P CABG x 3  [facility] sept. 2016 ENG LAD, SVG to circ, SVG to RPDA

## 2018-08-20 NOTE — ASU PATIENT PROFILE, ADULT - MEDICATION HERBAL REMEDIES, PROFILE
Subjective    Chief Complaint  This information was obtained from the patient  The patient was seen today for follow up and management of difficult to heal wound on her sacral area and right lateral foot.    10/5/17 patient is here for a wound vac applicati 8-14-17:  Patient was admitted to the hospital for UTI, fever and sacral pressure wound/diarrhea. Patient diagnosed with Clostridium difficile and treated with antibiotics.    Patient currently resides at [facility] and transported to  The periwound skin texture is normal. The periwound skin color is normal. The temperature of the periwound skin is Warm. Periwound skin does not exhibit signs or symptoms of infection. General Notes:  callus noted.     Wound #15 Right Third Toe is a Press Change dressing three times per week. NPWT Orders  [employer] VAC therapy, black foam at 125 mmHg continuous. RN to change dressing 3x/week, unless noted below. - HHC to change wound vac 2 x per week. Outpatient wound clinic to change Wound vac weekly.     Maggie Ivan Applied Primary Wound Dressing. using Xeroform 2x2 (1)  Applied Secondary Wound Dressing. using Mepilex border 4x4 (1)    Header Image    Negative Pressure Wound Therapy Maintenance (NPWT) Details  Patient Name: Alin Harris   Patient Number: [id number] no

## 2018-08-20 NOTE — H&P PST ADULT - ASSESSMENT
This is a 77 year old male with Angina class III symptoms and positive stress testing     Currently feels well  denies chest pain sob

## 2018-08-22 ENCOUNTER — OUTPATIENT (OUTPATIENT)
Dept: OUTPATIENT SERVICES | Facility: HOSPITAL | Age: 78
LOS: 1 days | End: 2018-08-22
Payer: MEDICARE

## 2018-08-22 ENCOUNTER — TRANSCRIPTION ENCOUNTER (OUTPATIENT)
Age: 78
End: 2018-08-22

## 2018-08-22 VITALS
SYSTOLIC BLOOD PRESSURE: 199 MMHG | OXYGEN SATURATION: 97 % | RESPIRATION RATE: 18 BRPM | TEMPERATURE: 98 F | HEART RATE: 61 BPM | DIASTOLIC BLOOD PRESSURE: 89 MMHG

## 2018-08-22 VITALS
SYSTOLIC BLOOD PRESSURE: 161 MMHG | HEART RATE: 84 BPM | RESPIRATION RATE: 18 BRPM | OXYGEN SATURATION: 96 % | DIASTOLIC BLOOD PRESSURE: 73 MMHG

## 2018-08-22 DIAGNOSIS — Z95.1 PRESENCE OF AORTOCORONARY BYPASS GRAFT: Chronic | ICD-10-CM

## 2018-08-22 DIAGNOSIS — I73.9 PERIPHERAL VASCULAR DISEASE, UNSPECIFIED: ICD-10-CM

## 2018-08-22 DIAGNOSIS — I25.10 ATHEROSCLEROTIC HEART DISEASE OF NATIVE CORONARY ARTERY WITHOUT ANGINA PECTORIS: Chronic | ICD-10-CM

## 2018-08-22 PROCEDURE — C1769: CPT

## 2018-08-22 PROCEDURE — C1894: CPT

## 2018-08-22 PROCEDURE — 93459 L HRT ART/GRFT ANGIO: CPT | Mod: 26

## 2018-08-22 PROCEDURE — C1887: CPT

## 2018-08-22 PROCEDURE — 99152 MOD SED SAME PHYS/QHP 5/>YRS: CPT

## 2018-08-22 PROCEDURE — 99153 MOD SED SAME PHYS/QHP EA: CPT

## 2018-08-22 PROCEDURE — 93459 L HRT ART/GRFT ANGIO: CPT

## 2018-08-22 PROCEDURE — 75716 ARTERY X-RAYS ARMS/LEGS: CPT | Mod: 26,XU

## 2018-08-22 PROCEDURE — 75716 ARTERY X-RAYS ARMS/LEGS: CPT

## 2018-08-22 RX ORDER — HYDRALAZINE HCL 50 MG
5 TABLET ORAL ONCE
Qty: 0 | Refills: 0 | Status: COMPLETED | OUTPATIENT
Start: 2018-08-22 | End: 2018-08-22

## 2018-08-22 RX ORDER — SODIUM CHLORIDE 9 MG/ML
300 INJECTION INTRAMUSCULAR; INTRAVENOUS; SUBCUTANEOUS ONCE
Qty: 0 | Refills: 0 | Status: COMPLETED | OUTPATIENT
Start: 2018-08-22 | End: 2018-08-22

## 2018-08-22 RX ADMIN — SODIUM CHLORIDE 300 MILLILITER(S): 9 INJECTION INTRAMUSCULAR; INTRAVENOUS; SUBCUTANEOUS at 12:35

## 2018-08-22 RX ADMIN — Medication 5 MILLIGRAM(S): at 15:05

## 2018-08-22 NOTE — DISCHARGE NOTE ADULT - PLAN OF CARE
Maintain optimal cardiac health No heavy lifting, driving, sex, tub baths, swimming, or any activity that submerges the lower half of the body in water for 48 hours.  Limited walking and stairs for 48 hours.    Change the bandaid after 24 hours and every 24 hours after that.  Keep the puncture site dry and covered with a bandaid until a scab forms.    Observe the site frequently.  If bleeding or a large lump (the size of a golf ball or bigger) occurs lie flat, apply continuous direct pressure just above the puncture site for at least 10 minutes, and notify your physician immediately.  If the bleeding cannot be controlled, call 911 immediately for assistance.  Notify your physician of pain, swelling or any drainage.    Notify your physician immediately if coldness, numbness, discoloration or pain in your foot occurs.

## 2018-08-22 NOTE — DISCHARGE NOTE ADULT - PATIENT PORTAL LINK FT
You can access the [facility], offered by [facility], by registering with the following website: [email or web address]

## 2018-08-22 NOTE — PROGRESS NOTE ADULT - SUBJECTIVE AND OBJECTIVE BOX
Nurse Practitioner Progress note:   s/p LHC revealing patent LIMA and SVG to the RPDL, SVG to the OM closed (as previously known)      Patient feels well.  Denies chest pain, shortness of breath, dizziness or palpitations at this time    Right groin procedure site CDI.  no bleeding, no hematoma, site soft, non tender, positive pedal pulses bilaterally  Right radial hemoband in place.  Procedure site CDI, no bleeding, no hematoma, able to move all digits with capillary refill < 3 seconds, fingers warm      T(C): 36.7 (08-22-18 @ 12:07), Max: 36.7 (08-22-18 @ 12:07)  HR: 61 (08-22-18 @ 12:07) (61 - 61)  BP: 168/73 (08-22-18 @ 12:16) (168/73 - 199/89)  RR: 18 (08-22-18 @ 12:07) (18 - 18)  SpO2: 97% (08-22-18 @ 12:07) (97% - 97%)      MEDICATIONS  (STANDING):  hydrALAZINE Injectable 5 milliGRAM(s) IV Push once        HPI:    now s/p [facility]  no intervention indicated    ASSESSMENT/PLAN:    Coronary artery disease  -Discharget to Home.  -Resume medications without change  -Plan of care discussed with patient, family and MD  -Follow-up with attending cardiologist within 1 month  -Discussed therapeutic lifestyle changes to reduce risk factors such as following a cardiac diet, weight loss, maintaining a healthy weight, exercise, smoking cessation, medication compliance, and regular follow-up  with MD to know your numbers (BP, cholesterol, weight, and glucose)

## 2018-08-22 NOTE — PROGRESS NOTE ADULT - SUBJECTIVE AND OBJECTIVE BOX
Patient is a 77y old  Male who presents with a chief complaint of     HPI: This is a 77 year old male presents to cardiologist with exertional SOB and fatigue . His history is significant for Claudication , bilateral carotid stenosis  HTN former smoker , HL PAF   CAD with a remote inferior Wall MI and RCA stent , CABG x 3 2016 LCX   graft occlusion, LM stenting in 2017 .   A stress test was done   7/19/18 revealed  moderate intensity reversible defect in mid anteroseptal , apical wall consistent with ischemia , small moderate sized intensity defect in basal mid - inferior posterior wall consistent with infarction , EF 58% with APCs and PVCs during stress testing with exertion la SOB and fatigue     Pt recommended to have a cardiac cath and further evaluation       PAST MEDICAL & SURGICAL HISTORY:  Hepatic cyst  Former smoker  PAF (paroxysmal atrial fibrillation)  BPH (benign prostatic hyperplasia)  Congenital kidney disease  SOB (shortness of breath): ANGINAL EQUIVALENT CLASS III  Old MI (myocardial infarction)  Coronary artery disease involving native coronary artery of native heart with angina pectoris: CABG 2016 ENG LAD, SVG TO CIRC, SVG TO RPDA  PCIs  Pure hypercholesterolemia  Essential hypertension  S/P CABG x 3: [facility] sept. 2016 ENG LAD, SVG to circ, SVG to RPDA  CAD S/P percutaneous coronary angioplasty      Allergies    No Known Allergies    Intolerances        MEDICATIONS  (STANDING):  sodium chloride 0.9% Bolus 300 milliLiter(s) IV Bolus once      Vital Signs Last 24 Hrs  T(C): 36.7 (22 Aug 2018 12:07), Max: 36.7 (22 Aug 2018 12:07)  T(F): 98.1 (22 Aug 2018 12:07), Max: 98.1 (22 Aug 2018 12:07)  HR: 61 (22 Aug 2018 12:07) (61 - 61)  BP: 168/73 (22 Aug 2018 12:16) (168/73 - 199/89)    RR: 18 (22 Aug 2018 12:07) (18 - 18)  SpO2: 97% (22 Aug 2018 12:07) (97% - 97%)    I&O's Detail      PHYSICAL EXAM:  Appearance: Normal, well nourished	  HEENT:   Normal oral mucosa,  Cardiovascular: Normal S1 S2, No JVD, No cardiac murmurs, No peripheral edema  Respiratory: Lungs clear to auscultation	  Psychiatry: A & O x 3, Mood & affect appropriate  Vascular: Peripheral pulses palpable 2+ bilaterally      INTERPRETATION OF TELEMETRY: Sinus Rhythm    Assessment and Plan:    CAD with reduced GFR and normal EF     cc bolus pre procedure  C                      I&O's Summary    BNP

## 2018-08-22 NOTE — DISCHARGE NOTE ADULT - HOSPITAL COURSE
HPI: This is a 77 year old male presents to cardiologist with exertional SOB and fatigue . His history is significant for Claudication , bilateral carotid stenosis  HTN former smoker , HL PAF   CAD with a remote inferior Wall MI and RCA stent , CABG x 3 2016 LCX   graft occlusion, LM stenting in 2017 .   A stress test was done   7/19/18 revealed  moderate intensity reversible defect in mid anteroseptal , apical wall consistent with ischemia , small moderate sized intensity defect in basal mid - inferior posterior wall consistent with infarction , EF 58% with APCs and PVCs during stress testing with exertion la SOB and fatigue     Pt recommended to have a cardiac cath and further evaluation     s/p LHC revealing patent LIMA and SVG to the RPDL, SVG to the OM closed (as previously known)    No intervention indicated.

## 2018-08-22 NOTE — DISCHARGE NOTE ADULT - CARE PLAN
Principal Discharge DX:	S/P coronary angiogram  Goal:	Maintain optimal cardiac health  Assessment and plan of treatment:	No heavy lifting, driving, sex, tub baths, swimming, or any activity that submerges the lower half of the body in water for 48 hours.  Limited walking and stairs for 48 hours.    Change the bandaid after 24 hours and every 24 hours after that.  Keep the puncture site dry and covered with a bandaid until a scab forms.    Observe the site frequently.  If bleeding or a large lump (the size of a golf ball or bigger) occurs lie flat, apply continuous direct pressure just above the puncture site for at least 10 minutes, and notify your physician immediately.  If the bleeding cannot be controlled, call 911 immediately for assistance.  Notify your physician of pain, swelling or any drainage.    Notify your physician immediately if coldness, numbness, discoloration or pain in your foot occurs.

## 2018-08-22 NOTE — DISCHARGE NOTE ADULT - MEDICATION SUMMARY - MEDICATIONS TO TAKE
I will START or STAY ON the medications listed below when I get home from the hospital:    aspirin 81 mg oral tablet, chewable  -- 1 tab(s) by mouth once a day  -- Indication: For Clot prevention    valsartan 320 mg oral tablet  -- 1 tab(s) by mouth once a day  -- Indication: For Heart Disease and clot prevention    tamsulosin 0.4 mg oral capsule  -- 1 cap(s) by mouth once a day (at bedtime)  -- Indication: For Prostate    atorvastatin 40 mg oral tablet  -- 1 tab(s) by mouth once a day (at bedtime)  -- Indication: For Cholesterol    clopidogrel 75 mg oral tablet  -- 1 tab(s) by mouth once a day  -- Indication: For Heart disease    metoprolol tartrate 50 mg oral tablet  -- 1 tab(s) by mouth 2 times a day  -- Indication: For Blood pressure

## 2018-08-22 NOTE — DISCHARGE NOTE ADULT - CARE PROVIDER_API CALL
Allen Piedra (MD), Cardiovascular Disease  [address] [place] [address]  Phone: [phone]  Fax: [phone]

## 2018-09-26 ENCOUNTER — APPOINTMENT (OUTPATIENT)
Dept: CARDIOLOGY | Facility: CLINIC | Age: 78
End: 2018-09-26
Payer: MEDICARE

## 2018-09-26 PROCEDURE — 93925 LOWER EXTREMITY STUDY: CPT

## 2018-10-12 ENCOUNTER — APPOINTMENT (OUTPATIENT)
Dept: CARDIOLOGY | Facility: CLINIC | Age: 78
End: 2018-10-12
Payer: MEDICARE

## 2018-10-12 VITALS
HEIGHT: 71 IN | BODY MASS INDEX: 29.26 KG/M2 | WEIGHT: 209 LBS | SYSTOLIC BLOOD PRESSURE: 140 MMHG | HEART RATE: 67 BPM | DIASTOLIC BLOOD PRESSURE: 65 MMHG | RESPIRATION RATE: 18 BRPM

## 2018-10-12 PROCEDURE — 99214 OFFICE O/P EST MOD 30 MIN: CPT

## 2018-10-12 PROCEDURE — 93000 ELECTROCARDIOGRAM COMPLETE: CPT

## 2018-10-12 RX ORDER — CEPHALEXIN 500 MG/1
500 CAPSULE ORAL
Qty: 10 | Refills: 0 | Status: DISCONTINUED | COMMUNITY
Start: 2018-06-19

## 2018-12-03 ENCOUNTER — APPOINTMENT (OUTPATIENT)
Dept: VASCULAR SURGERY | Facility: CLINIC | Age: 78
End: 2018-12-03
Payer: MEDICARE

## 2018-12-03 VITALS
DIASTOLIC BLOOD PRESSURE: 71 MMHG | WEIGHT: 211 LBS | TEMPERATURE: 97.7 F | HEART RATE: 73 BPM | SYSTOLIC BLOOD PRESSURE: 124 MMHG | BODY MASS INDEX: 29.54 KG/M2 | HEIGHT: 71 IN | OXYGEN SATURATION: 98 %

## 2018-12-03 DIAGNOSIS — Z86.39 PERSONAL HISTORY OF OTHER ENDOCRINE, NUTRITIONAL AND METABOLIC DISEASE: ICD-10-CM

## 2018-12-03 DIAGNOSIS — Z82.49 FAMILY HISTORY OF ISCHEMIC HEART DISEASE AND OTHER DISEASES OF THE CIRCULATORY SYSTEM: ICD-10-CM

## 2018-12-03 DIAGNOSIS — Z86.79 PERSONAL HISTORY OF OTHER DISEASES OF THE CIRCULATORY SYSTEM: ICD-10-CM

## 2018-12-03 DIAGNOSIS — I25.2 OLD MYOCARDIAL INFARCTION: ICD-10-CM

## 2018-12-03 DIAGNOSIS — Z80.0 FAMILY HISTORY OF MALIGNANT NEOPLASM OF DIGESTIVE ORGANS: ICD-10-CM

## 2018-12-03 PROCEDURE — 93923 UPR/LXTR ART STDY 3+ LVLS: CPT

## 2018-12-03 PROCEDURE — 99203 OFFICE O/P NEW LOW 30 MIN: CPT

## 2018-12-03 RX ORDER — MOMETASONE 50 UG/1
50 SPRAY, METERED NASAL
Qty: 17 | Refills: 0 | Status: DISCONTINUED | COMMUNITY
Start: 2017-10-05 | End: 2018-12-03

## 2018-12-04 PROBLEM — Z82.49 FAMILY HISTORY OF MYOCARDIAL INFARCTION: Status: ACTIVE | Noted: 2018-12-03

## 2018-12-04 PROBLEM — I25.2 HISTORY OF MYOCARDIAL INFARCTION: Status: RESOLVED | Noted: 2018-12-03 | Resolved: 2018-12-04

## 2018-12-04 PROBLEM — Z86.79 HISTORY OF CARDIAC DISORDER: Status: RESOLVED | Noted: 2018-12-03 | Resolved: 2018-12-04

## 2018-12-04 PROBLEM — Z86.39 HISTORY OF HIGH CHOLESTEROL: Status: RESOLVED | Noted: 2018-12-03 | Resolved: 2018-12-04

## 2018-12-04 PROBLEM — Z86.79 HISTORY OF VASCULAR DISORDER: Status: RESOLVED | Noted: 2018-12-03 | Resolved: 2018-12-04

## 2018-12-04 PROBLEM — Z82.49 FAMILY HISTORY OF PHLEBITIS: Status: ACTIVE | Noted: 2018-12-03

## 2018-12-04 PROBLEM — Z82.49 FAMILY HISTORY OF CARDIAC DISORDER: Status: ACTIVE | Noted: 2018-12-03

## 2018-12-04 PROBLEM — Z82.49 FAMILY HISTORY OF HYPERTENSION: Status: ACTIVE | Noted: 2018-12-03

## 2018-12-04 PROBLEM — Z80.0 FAMILY HISTORY OF MALIGNANT NEOPLASM OF COLON: Status: ACTIVE | Noted: 2018-12-03

## 2018-12-05 ENCOUNTER — RX RENEWAL (OUTPATIENT)
Age: 78
End: 2018-12-05

## 2018-12-17 ENCOUNTER — RX RENEWAL (OUTPATIENT)
Age: 78
End: 2018-12-17

## 2018-12-17 RX ORDER — METOPROLOL TARTRATE 75 MG/1
TABLET, FILM COATED ORAL
Refills: 0 | Status: DISCONTINUED | COMMUNITY
End: 2018-12-17

## 2019-01-09 ENCOUNTER — APPOINTMENT (OUTPATIENT)
Dept: CARDIOLOGY | Facility: CLINIC | Age: 79
End: 2019-01-09
Payer: MEDICARE

## 2019-01-09 VITALS
WEIGHT: 212 LBS | BODY MASS INDEX: 29.68 KG/M2 | HEART RATE: 76 BPM | HEIGHT: 71 IN | RESPIRATION RATE: 16 BRPM | DIASTOLIC BLOOD PRESSURE: 58 MMHG | SYSTOLIC BLOOD PRESSURE: 110 MMHG

## 2019-01-09 PROCEDURE — 93000 ELECTROCARDIOGRAM COMPLETE: CPT

## 2019-01-09 PROCEDURE — 99214 OFFICE O/P EST MOD 30 MIN: CPT

## 2019-01-09 RX ORDER — NITROGLYCERIN 0.4 MG/1
0.4 TABLET SUBLINGUAL
Qty: 30 | Refills: 3 | Status: ACTIVE | COMMUNITY
Start: 2019-01-09 | End: 1900-01-01

## 2019-01-09 NOTE — PHYSICAL EXAM
[General Appearance - Well Developed] : well developed [Normal Appearance] : normal appearance [Well Groomed] : well groomed [General Appearance - Well Nourished] : well nourished [No Deformities] : no deformities [General Appearance - In No Acute Distress] : no acute distress [Normal Conjunctiva] : the conjunctiva exhibited no abnormalities [Eyelids - No Xanthelasma] : the eyelids demonstrated no xanthelasmas [Normal Oral Mucosa] : normal oral mucosa [No Oral Pallor] : no oral pallor [No Oral Cyanosis] : no oral cyanosis [Normal Jugular Venous A Waves Present] : normal jugular venous A waves present [Normal Jugular Venous V Waves Present] : normal jugular venous V waves present [No Jugular Venous Catalan A Waves] : no jugular venous catalan A waves [Respiration, Rhythm And Depth] : normal respiratory rhythm and effort [Exaggerated Use Of Accessory Muscles For Inspiration] : no accessory muscle use [Auscultation Breath Sounds / Voice Sounds] : lungs were clear to auscultation bilaterally [Heart Rate And Rhythm] : heart rate and rhythm were normal [Heart Sounds] : normal S1 and S2 [Murmurs] : no murmurs present [Edema] : no peripheral edema present [FreeTextEntry1] : Left sided carotid bruit, Diminished Pedal pulses bilaterally with left worse than right [Abdomen Soft] : soft [Abdomen Tenderness] : non-tender [Abdomen Mass (___ Cm)] : no abdominal mass palpated [Abnormal Walk] : normal gait [Gait - Sufficient For Exercise Testing] : the gait was sufficient for exercise testing [Nail Clubbing] : no clubbing of the fingernails [Cyanosis, Localized] : no localized cyanosis [Petechial Hemorrhages (___cm)] : no petechial hemorrhages [Skin Color & Pigmentation] : normal skin color and pigmentation [] : no rash [No Venous Stasis] : no venous stasis [Skin Lesions] : no skin lesions [No Skin Ulcers] : no skin ulcer [No Xanthoma] : no  xanthoma was observed [Oriented To Time, Place, And Person] : oriented to person, place, and time [Affect] : the affect was normal [Mood] : the mood was normal [No Anxiety] : not feeling anxious

## 2019-01-09 NOTE — REVIEW OF SYSTEMS
[Fever] : no fever [Headache] : no headache [Recent Weight Gain (___ Lbs)] : no recent weight gain [Chills] : no chills [Feeling Fatigued] : feeling fatigued [Recent Weight Loss (___ Lbs)] : no recent weight loss [Urinary Frequency] : urinary frequency [Hematuria] : no hematuria [Pain During Urination] : no dysuria [Nocturia] : nocturia [Lower Back Pain] : lower back pain [Negative] : Heme/Lymph

## 2019-01-09 NOTE — HISTORY OF PRESENT ILLNESS
[FreeTextEntry1] : \par Patient complains of increasing exertional dyspnea of at least one month's duration. This is at times associated with increased belching. These symptoms are somewhat reminiscent of that which he had previously experienced when he had coronary artery stenoses. Notably, the patient just had a cardiac catheterization 8/22/18. That demonstrated patency of the LIMA to the LAD. Patency of the vein graft to the RCA.\par There was only a 20% left main in-stent restenosis.\par \par He still continues to walk daily to his ambulation is limited at about 150 feet by bilateral claudication.\par He had obtained a vascular evaluation and seemed a bit uncertain about how and whether to proceed. It seems he was offered angiography and potential PCI.\par . Only complaint is of some increased urinary frequency and some lower back pain. Both of these are chronic.\par \par

## 2019-01-09 NOTE — ASSESSMENT
[FreeTextEntry1] : ECG: AF with VR 76  BPM beats per minutes  nonspecific T wave flattening.\par \par 24-hour Holter monitor (8/13/18:\par Predominant sinus rhythm with intermittent periods of paroxysmal atrial fibrillation.\par Occasional multifocal PVCs.\par No significant pauses.\par \par Cardiac catheterization in 8/22/18:\par Left main 20% in-stent stenosis\par LAD fills via LIMA graft\par Circumflex normal\par RCA 50% proximal in-stent stenosis\par          80% mid RCA in-stent stenosis\par Graft to the RCA is patent\par \par Peripheral angiography:\par Right SFA occlusion\par Bilateral 80% external iliac stenosis.\par \par Laboratory data \par                    8/2/18:   1/3/19\par Cholesterol 119          110\par HDL              39           43\par LDL              63            51\par Electrolytes normal.\par Creatinine 1.35\par Liver function tests normal\par Largely all unchanged from prior evaluation.\par \par Exercise sestamibi stress test 7/18/18:\par Patient exercised for 5 minutes (6 minutes).\par Peak heart rate 148 (104% maximum.\par Fatigability but no chest pain.\par Blood pressure response appropriate.\par No significant ECG changes.\par SPECT findings include a small moderate sized inferior defect consistent with infarction.\par Reversible anteroseptal and apical defect consistent with ischemia.\par \par Carotid study 7/2/18\par Bilateral large plaquing of the ICA and RCA with moderate 50-69% stenoses of both plaque in the ECA noted as well.\par In comparison with a prior study there seems to be some progression.\par \par \par Echocardiogram: January 18, 2018\par Overall normal left ventricle systolic function.\par Mid inferior lateral hypokinesis.\par Left atrial dilatation\par Mild to moderate mitral regurgitation\par Plaquing of the ascending aorta\par \par Impression:\par 1. Patient with one month of exertional shortness of breath and belching suggestive of angina. Could also reflect more chronic atrial fibrillation. Above cath data from 5 months ago noted.\par 2.  Hypertension seems better with the addition of Nifedipine\par 3. Hyperlipidemia well controlled. Other labs no change\par 4. Carotid disease has progressed to moderate compared to when last assessed \par 4. After a CABG x3 and left main stenting,  possible ischemia on stress testing but angiography reveals no sig stenosis\par 5. There is a mild area of inferolateral hypokinesis with preserved left ventricular systolic function and no substantial valvular disease\par 6. PAF possibly contributing to fatigue\par 7. PAD as described above, with stable claudication  no absolute ndication for intervention at this time\par \par Plan is to:\par 1. Use NTG in a therapeutic trial\par 2 recheck on the blood work, May\par 3. In view of the new claudication, will need continued regular exercise\par 4. Repeat carotid study 7/19,\par 5. OV and reassess 1 month.

## 2019-01-14 ENCOUNTER — CLINICAL ADVICE (OUTPATIENT)
Age: 79
End: 2019-01-14

## 2019-01-18 ENCOUNTER — INPATIENT (INPATIENT)
Facility: HOSPITAL | Age: 79
LOS: 4 days | Discharge: HOME | DRG: 812 | End: 2019-01-23
Attending: INTERNAL MEDICINE | Admitting: INTERNAL MEDICINE
Payer: MEDICARE

## 2019-01-18 VITALS
HEIGHT: 71 IN | HEART RATE: 61 BPM | OXYGEN SATURATION: 100 % | DIASTOLIC BLOOD PRESSURE: 71 MMHG | RESPIRATION RATE: 18 BRPM | SYSTOLIC BLOOD PRESSURE: 133 MMHG | TEMPERATURE: 97 F

## 2019-01-18 DIAGNOSIS — Z95.1 PRESENCE OF AORTOCORONARY BYPASS GRAFT: Chronic | ICD-10-CM

## 2019-01-18 DIAGNOSIS — R07.9 CHEST PAIN, UNSPECIFIED: ICD-10-CM

## 2019-01-18 DIAGNOSIS — I25.10 ATHEROSCLEROTIC HEART DISEASE OF NATIVE CORONARY ARTERY WITHOUT ANGINA PECTORIS: Chronic | ICD-10-CM

## 2019-01-18 DIAGNOSIS — D64.9 ANEMIA, UNSPECIFIED: ICD-10-CM

## 2019-01-18 DIAGNOSIS — I25.119 ATHEROSCLEROTIC HEART DISEASE OF NATIVE CORONARY ARTERY WITH UNSPECIFIED ANGINA PECTORIS: ICD-10-CM

## 2019-01-18 DIAGNOSIS — I48.0 PAROXYSMAL ATRIAL FIBRILLATION: ICD-10-CM

## 2019-01-18 LAB
ABO RH CONFIRMATION: SIGNIFICANT CHANGE UP
ANION GAP SERPL CALC-SCNC: 14 MMOL/L — SIGNIFICANT CHANGE UP (ref 5–17)
APTT BLD: 33.7 SEC — SIGNIFICANT CHANGE UP (ref 27.5–36.3)
BLD GP AB SCN SERPL QL: SIGNIFICANT CHANGE UP
BUN SERPL-MCNC: 15 MG/DL — SIGNIFICANT CHANGE UP (ref 8–20)
CALCIUM SERPL-MCNC: 8.9 MG/DL — SIGNIFICANT CHANGE UP (ref 8.6–10.2)
CHLORIDE SERPL-SCNC: 105 MMOL/L — SIGNIFICANT CHANGE UP (ref 98–107)
CO2 SERPL-SCNC: 21 MMOL/L — LOW (ref 22–29)
CREAT SERPL-MCNC: 1.44 MG/DL — HIGH (ref 0.5–1.3)
GLUCOSE SERPL-MCNC: 95 MG/DL — SIGNIFICANT CHANGE UP (ref 70–115)
HCT VFR BLD CALC: 21.7 % — LOW (ref 42–52)
HCT VFR BLD CALC: 23.9 % — LOW (ref 42–52)
HGB BLD-MCNC: 6.1 G/DL — CRITICAL LOW (ref 14–18)
HGB BLD-MCNC: 6.3 G/DL — CRITICAL LOW (ref 14–18)
INR BLD: 1.16 RATIO — SIGNIFICANT CHANGE UP (ref 0.88–1.16)
MCHC RBC-ENTMCNC: 18.3 PG — LOW (ref 27–31)
MCHC RBC-ENTMCNC: 19.1 PG — LOW (ref 27–31)
MCHC RBC-ENTMCNC: 26.4 G/DL — LOW (ref 32–36)
MCHC RBC-ENTMCNC: 28.1 G/DL — LOW (ref 32–36)
MCV RBC AUTO: 68 FL — LOW (ref 80–94)
MCV RBC AUTO: 69.5 FL — LOW (ref 80–94)
PLATELET # BLD AUTO: 278 K/UL — SIGNIFICANT CHANGE UP (ref 150–400)
PLATELET # BLD AUTO: 327 K/UL — SIGNIFICANT CHANGE UP (ref 150–400)
POTASSIUM SERPL-MCNC: 4.4 MMOL/L — SIGNIFICANT CHANGE UP (ref 3.5–5.3)
POTASSIUM SERPL-SCNC: 4.4 MMOL/L — SIGNIFICANT CHANGE UP (ref 3.5–5.3)
PROTHROM AB SERPL-ACNC: 13.4 SEC — HIGH (ref 10–12.9)
RBC # BLD: 3.19 M/UL — LOW (ref 4.6–6.2)
RBC # BLD: 3.44 M/UL — LOW (ref 4.6–6.2)
RBC # FLD: 16.8 % — HIGH (ref 11–15.6)
RBC # FLD: 17.1 % — HIGH (ref 11–15.6)
SODIUM SERPL-SCNC: 140 MMOL/L — SIGNIFICANT CHANGE UP (ref 135–145)
TYPE + AB SCN PNL BLD: SIGNIFICANT CHANGE UP
WBC # BLD: 6.9 K/UL — SIGNIFICANT CHANGE UP (ref 4.8–10.8)
WBC # BLD: 7.5 K/UL — SIGNIFICANT CHANGE UP (ref 4.8–10.8)
WBC # FLD AUTO: 6.9 K/UL — SIGNIFICANT CHANGE UP (ref 4.8–10.8)
WBC # FLD AUTO: 7.5 K/UL — SIGNIFICANT CHANGE UP (ref 4.8–10.8)

## 2019-01-18 PROCEDURE — 99232 SBSQ HOSP IP/OBS MODERATE 35: CPT

## 2019-01-18 PROCEDURE — 99223 1ST HOSP IP/OBS HIGH 75: CPT

## 2019-01-18 RX ORDER — ATORVASTATIN CALCIUM 80 MG/1
40 TABLET, FILM COATED ORAL AT BEDTIME
Qty: 0 | Refills: 0 | Status: DISCONTINUED | OUTPATIENT
Start: 2019-01-18 | End: 2019-01-23

## 2019-01-18 RX ORDER — METOPROLOL TARTRATE 50 MG
50 TABLET ORAL
Qty: 0 | Refills: 0 | Status: DISCONTINUED | OUTPATIENT
Start: 2019-01-18 | End: 2019-01-23

## 2019-01-18 RX ORDER — TAMSULOSIN HYDROCHLORIDE 0.4 MG/1
0.4 CAPSULE ORAL AT BEDTIME
Qty: 0 | Refills: 0 | Status: DISCONTINUED | OUTPATIENT
Start: 2019-01-18 | End: 2019-01-23

## 2019-01-18 RX ORDER — VALSARTAN 80 MG/1
1 TABLET ORAL
Qty: 0 | Refills: 0 | COMMUNITY

## 2019-01-18 RX ORDER — NIFEDIPINE 30 MG
30 TABLET, EXTENDED RELEASE 24 HR ORAL DAILY
Qty: 0 | Refills: 0 | Status: DISCONTINUED | OUTPATIENT
Start: 2019-01-19 | End: 2019-01-23

## 2019-01-18 RX ORDER — NIFEDIPINE 30 MG
1 TABLET, EXTENDED RELEASE 24 HR ORAL
Qty: 0 | Refills: 0 | COMMUNITY

## 2019-01-18 RX ORDER — PANTOPRAZOLE SODIUM 20 MG/1
40 TABLET, DELAYED RELEASE ORAL
Qty: 0 | Refills: 0 | Status: DISCONTINUED | OUTPATIENT
Start: 2019-01-18 | End: 2019-01-23

## 2019-01-18 RX ORDER — CLOPIDOGREL BISULFATE 75 MG/1
75 TABLET, FILM COATED ORAL DAILY
Qty: 0 | Refills: 0 | Status: DISCONTINUED | OUTPATIENT
Start: 2019-01-18 | End: 2019-01-23

## 2019-01-18 RX ORDER — APIXABAN 2.5 MG/1
1 TABLET, FILM COATED ORAL
Qty: 0 | Refills: 0 | COMMUNITY

## 2019-01-18 RX ADMIN — ATORVASTATIN CALCIUM 40 MILLIGRAM(S): 80 TABLET, FILM COATED ORAL at 22:16

## 2019-01-18 RX ADMIN — Medication 50 MILLIGRAM(S): at 16:56

## 2019-01-18 RX ADMIN — TAMSULOSIN HYDROCHLORIDE 0.4 MILLIGRAM(S): 0.4 CAPSULE ORAL at 22:16

## 2019-01-18 NOTE — CONSULT NOTE ADULT - PROBLEM SELECTOR RECOMMENDATION 9
Rule out occult GI blood loss. Pt. w/o evidence of active GI bleeding clinically. Iron studies ordered. Transfuse to Hb greater than 8 grams. Cardiology evaluation and clearance for inpatient GI w/u early next week. PO Pantoprazole 40 mg./d. Repeat labs ordered for the AM.

## 2019-01-18 NOTE — H&P PST ADULT - PSH
CAD S/P percutaneous coronary angioplasty    S/P CABG x 3  [facility] sept. 2016 ENG LAD, SVG to circ, SVG to RPDA

## 2019-01-18 NOTE — H&P PST ADULT - ASSESSMENT
79 y/o male with significant cardiac history with c/o fatigue and SCHROEDER c/w angina for LHC +/- PCI.

## 2019-01-18 NOTE — CONSULT NOTE ADULT - SUBJECTIVE AND OBJECTIVE BOX
Patient is a 78y old  Male who presents with a chief complaint of SOB (18 Jan 2019 11:58)      HPI:  This is a 78 year old male presents to cardiologist with exertional SOB and fatigue suggestive of angina and similar to what he was experiencing when he had coronary artery stenoses.  His history is significant for Claudication , bilateral carotid stenosis  HTN former smoker , HL PAF   CAD with a remote inferior Wall MI and RCA stent , CABG x 3 2016 LCX   graft occlusion, LM stenting in 2017 .   A stress test was done 7/19/18 revealed  moderate intensity reversible defect in mid anteroseptal , apical wall consistent with ischemia , small moderate sized intensity defect in basal mid - inferior posterior wall consistent with infarction , EF 58% with APCs and PVCs during stress testing with exertional SOB and fatigue.  Cardiac cath 8/2018  revealed patent ENG to LAD and patent SVG to RCA.  There was only 20% LM ISR. Pt recommended to have a cardiac cath and further evaluation in lieu of his history and symptoms. TTE 1/18/18: normal LV; mid inferior lateral hypokinesis; left atrial dilatation; mild to mod MR; plaquing of the ascending aorta (18 Jan 2019 11:58). Pt was found to be anemic here in the hospital with a Hb of roughly 6.8 grams. He denies gross hematochezia or melena or anorexia or change in bowel habits or unexplained weight loss or anorexia. He had a negative colonoscopy done roughly 10 years ago and had a negative Cologard test roughly 4 years ago.      REVIEW OF SYSTEMS:  Constitutional: No fever, weight loss or fatigue  ENMT:  No difficulty hearing, tinnitus, vertigo; No sinus or throat pain  Respiratory: No cough, wheezing, chills or hemoptysis, + exertional SOB  Cardiovascular: No chest pain, palpitations,  positive dizziness or leg swelling  Gastrointestinal: As noted above. No abdominal or epigastric pain. No nausea, vomiting or hematemesis; No diarrhea or constipation. No melena or hematochezia.  Skin: No itching, burning, rashes or lesions   Musculoskeletal: No joint pain or swelling; No muscle, back or extremity pain  Patient has no cardiopulmonary, peripheral vascular, musculoskeletal, dermatological, neurological, or psychological symptoms or complaints at this time.    PAST MEDICAL & SURGICAL HISTORY:  Hepatic cyst  Former smoker  PAF (paroxysmal atrial fibrillation)  BPH (benign prostatic hyperplasia)  Congenital kidney disease  SOB (shortness of breath): ANGINAL EQUIVALENT CLASS III  Old MI (myocardial infarction)  Coronary artery disease involving native coronary artery of native heart with angina pectoris: CABG 2016 ENG LAD, SVG TO CIRC, SVG TO RPDA  PCIs  Pure hypercholesterolemia  Essential hypertension  S/P CABG x 3: [facility] sept. 2016 ENG LAD, SVG to circ, SVG to RPDA  CAD S/P percutaneous coronary angioplasty      FAMILY HISTORY:      SOCIAL HISTORY:  Smoking Status: [ ] Current, [x ] Former, [ ] Never  Pack Years: < 10, No ETOH or drug abuse history.    MEDICATIONS:  MEDICATIONS  (STANDING):  atorvastatin 40 milliGRAM(s) Oral at bedtime  clopidogrel Tablet 75 milliGRAM(s) Oral daily  metoprolol tartrate 50 milliGRAM(s) Oral two times a day  pantoprazole    Tablet 40 milliGRAM(s) Oral before breakfast  tamsulosin 0.4 milliGRAM(s) Oral at bedtime    MEDICATIONS  (PRN):      Allergies    No Known Allergies    Intolerances        Vital Signs Last 24 Hrs  T(C): 36.3 (18 Jan 2019 11:07), Max: 36.3 (18 Jan 2019 11:07)  T(F): 97.3 (18 Jan 2019 11:07), Max: 97.3 (18 Jan 2019 11:07)  HR: 59 (18 Jan 2019 14:20) (59 - 61)  BP: 148/63 (18 Jan 2019 14:20) (133/71 - 148/63)  BP(mean): --  RR: 18 (18 Jan 2019 14:20) (18 - 18)  SpO2: 99% (18 Jan 2019 14:20) (99% - 100%)        PHYSICAL EXAM:  General: Well developed; well nourished; in no acute distress  HEENT: MMM, conjunctiva pale and sclera anicteric.  Lungs: Clear bilaterally.  Cor: RRR S1, S2 only  Gastrointestinal: Abdomen: Soft, non-tender non-distended; Normal bowel sounds; No rebound or guarding or palpable HSM.  EVIE: Decreased sphincter tone, + prostatic hypertrophy, no masses or tenderness or stool or blood in the rectal vault.  Extremities: Normal range of motion, No clubbing, cyanosis or edema  Neurological: Alert and oriented x3  Skin: Warm and dry. No obvious rash      LABS:                        6.1    6.9   )-----------( 278      ( 18 Jan 2019 12:46 )             21.7     01-18    140  |  105  |  15.0  ----------------------------<  95  4.4   |  21.0<L>  |  1.44<H>    Ca    8.9      18 Jan 2019 11:23            RADIOLOGY & ADDITIONAL STUDIES:

## 2019-01-18 NOTE — PROGRESS NOTE ADULT - SUBJECTIVE AND OBJECTIVE BOX
Cardiology NP note addendum:    -see H & P  -Patient presented today for elective LHC for c/o SCHROEDER and fatigue.  Preprocedure blood work revealed significant anemia.  Patient denies any sign/symptoms of active bleeding and black tarry stools.  Procedure cancelled.  -Admit to tele--Dr. Cook's service  -Transfuse 2 units PRBCs for Hgb 6.1  -GI consult called--Dr. Spencer at bedside to see patient  -Dr. Ballard group to see patient during admission from cardiology standpoint  -Protonix PO as per GI  -Home meds maintained including beta blocker, statin, arb.    -Hold Eliquis and ASA for now until source of bleeding is found  -Maintain plavix secondary to h/o coronary stents as per Dr. Saldivar  -Discussed with Dr. Saldivar, Dr. Cook, and Dr. Spencer  -Additional plan as per Attending MD

## 2019-01-18 NOTE — PROGRESS NOTE ADULT - SUBJECTIVE AND OBJECTIVE BOX
ASAEL SMITH     Chief Complaint: Patient is a 78y old  Male who presents with a chief complaint of Anemia (18 Jan 2019 15:11)      PAST MEDICAL & SURGICAL HISTORY:  Hepatic cyst  Former smoker  PAF (paroxysmal atrial fibrillation)  BPH (benign prostatic hyperplasia)  Congenital kidney disease  SOB (shortness of breath): ANGINAL EQUIVALENT CLASS III  Old MI (myocardial infarction)  Coronary artery disease involving native coronary artery of native heart with angina pectoris: CABG 2016 ENG LAD, SVG TO CIRC, SVG TO RPDA  PCIs  Pure hypercholesterolemia  Essential hypertension  S/P CABG x 3: [facility] sept. 2016 ENG LAD, SVG to circ, SVG to RPDA  CAD S/P percutaneous coronary angioplasty      HPI/OVERNIGHT EVENTS: Patient in no distress    MEDICATIONS  (STANDING):  atorvastatin 40 milliGRAM(s) Oral at bedtime  clopidogrel Tablet 75 milliGRAM(s) Oral daily  metoprolol tartrate 50 milliGRAM(s) Oral two times a day  pantoprazole    Tablet 40 milliGRAM(s) Oral before breakfast  tamsulosin 0.4 milliGRAM(s) Oral at bedtime      Vital Signs Last 24 Hrs  T(C): 36.4 (18 Jan 2019 16:19), Max: 36.4 (18 Jan 2019 16:01)  T(F): 97.5 (18 Jan 2019 16:19), Max: 97.5 (18 Jan 2019 16:01)  HR: 78 (18 Jan 2019 16:19) (59 - 78)  BP: 144/72 (18 Jan 2019 16:19) (133/71 - 160/73)  BP(mean): --  RR: 18 (18 Jan 2019 16:19) (18 - 18)  SpO2: 100% (18 Jan 2019 16:19) (99% - 100%)    PHYSICAL EXAM:  HEENT: PERRLA, EOMI, Normal Hearing  Neck: No LAD, No JVD  Back: No CVA tenderness  Respiratory: CTAB Cardiovascular: S1 and S2, RRR, no M/G/R  Gastrointestinal: BS+, soft, NT/ND  Extremities: No peripheral edema  Vascular: 2+ peripheral pulses  Neurological: A/O x 3, no focal deficits        CAPILLARY BLOOD GLUCOSE    LABS:                        6.1    6.9   )-----------( 278      ( 18 Jan 2019 12:46 )             21.7     01-18    140  |  105  |  15.0  ----------------------------<  95  4.4   |  21.0<L>  |  1.44<H>    Ca    8.9      18 Jan 2019 11:23      PT/INR - ( 18 Jan 2019 12:32 )   PT: 13.4 sec;   INR: 1.16 ratio         PTT - ( 18 Jan 2019 12:32 )  PTT:33.7 sec      RADIOLOGY & ADDITIONAL TESTS:

## 2019-01-18 NOTE — H&P PST ADULT - HISTORY OF PRESENT ILLNESS
This is a 78 year old male presents to cardiologist with exertional SOB and fatigue suggestive of angina and similar to what he was experiencing when he had coronary artery stenoses.  His history is significant for Claudication , bilateral carotid stenosis  HTN former smoker , HL PAF   CAD with a remote inferior Wall MI and RCA stent , CABG x 3 2016 LCX   graft occlusion, LM stenting in 2017 .   A stress test was done   7/19/18 revealed  moderate intensity reversible defect in mid anteroseptal , apical wall consistent with ischemia , small moderate sized intensity defect in basal mid - inferior posterior wall consistent with infarction , EF 58% with APCs and PVCs during stress testing with exertional SOB and fatigue.  Cardiac cath 8/2018  revealed patent ENG to LAD and patent SVG to RCA.  There was only 20% LM ISR.    Pt recommended to have a cardiac cath and further evaluation in lieu of his history and symptoms.    TTE 1/18/18: normal LV; mid inferior lateral hypokinesis; left atrial dilatation; mild to mod MR; plaquing of the ascending aorta This is a 78 year old male presents to cardiologist with exertional SOB and fatigue suggestive of angina and similar to what he was experiencing when he had coronary artery stenoses.  His history is significant for Claudication , bilateral carotid stenosis  HTN former smoker , HL PAF   CAD with a remote inferior Wall MI and RCA stent , CABG x 3 2016 LCX   graft occlusion, LM stenting in 2017 .   A stress test was done 7/19/18 revealed  moderate intensity reversible defect in mid anteroseptal , apical wall consistent with ischemia , small moderate sized intensity defect in basal mid - inferior posterior wall consistent with infarction , EF 58% with APCs and PVCs during stress testing with exertional SOB and fatigue.  Cardiac cath 8/2018  revealed patent ENG to LAD and patent SVG to RCA.  There was only 20% LM ISR.    Pt recommended to have a cardiac cath and further evaluation in lieu of his history and symptoms.    TTE 1/18/18: normal LV; mid inferior lateral hypokinesis; left atrial dilatation; mild to mod MR; plaquing of the ascending aorta

## 2019-01-19 LAB
ANION GAP SERPL CALC-SCNC: 13 MMOL/L — SIGNIFICANT CHANGE UP (ref 5–17)
ANISOCYTOSIS BLD QL: SLIGHT — SIGNIFICANT CHANGE UP
BASOPHILS # BLD AUTO: 0 K/UL — SIGNIFICANT CHANGE UP (ref 0–0.2)
BASOPHILS NFR BLD AUTO: 0.3 % — SIGNIFICANT CHANGE UP (ref 0–2)
BUN SERPL-MCNC: 15 MG/DL — SIGNIFICANT CHANGE UP (ref 8–20)
CALCIUM SERPL-MCNC: 8.9 MG/DL — SIGNIFICANT CHANGE UP (ref 8.6–10.2)
CHLORIDE SERPL-SCNC: 106 MMOL/L — SIGNIFICANT CHANGE UP (ref 98–107)
CO2 SERPL-SCNC: 22 MMOL/L — SIGNIFICANT CHANGE UP (ref 22–29)
CREAT SERPL-MCNC: 1.3 MG/DL — SIGNIFICANT CHANGE UP (ref 0.5–1.3)
EOSINOPHIL # BLD AUTO: 0.2 K/UL — SIGNIFICANT CHANGE UP (ref 0–0.5)
EOSINOPHIL NFR BLD AUTO: 2.9 % — SIGNIFICANT CHANGE UP (ref 0–5)
GLUCOSE SERPL-MCNC: 82 MG/DL — SIGNIFICANT CHANGE UP (ref 70–115)
HCT VFR BLD CALC: 27.4 % — LOW (ref 42–52)
HGB BLD-MCNC: 8 G/DL — LOW (ref 14–18)
HYPOCHROMIA BLD QL: SLIGHT — SIGNIFICANT CHANGE UP
LYMPHOCYTES # BLD AUTO: 1.4 K/UL — SIGNIFICANT CHANGE UP (ref 1–4.8)
LYMPHOCYTES # BLD AUTO: 22.7 % — SIGNIFICANT CHANGE UP (ref 20–55)
MCHC RBC-ENTMCNC: 20.9 PG — LOW (ref 27–31)
MCHC RBC-ENTMCNC: 29.2 G/DL — LOW (ref 32–36)
MCV RBC AUTO: 71.5 FL — LOW (ref 80–94)
MICROCYTES BLD QL: SLIGHT — SIGNIFICANT CHANGE UP
MONOCYTES # BLD AUTO: 0.5 K/UL — SIGNIFICANT CHANGE UP (ref 0–0.8)
MONOCYTES NFR BLD AUTO: 8.4 % — SIGNIFICANT CHANGE UP (ref 3–10)
NEUTROPHILS # BLD AUTO: 4.1 K/UL — SIGNIFICANT CHANGE UP (ref 1.8–8)
NEUTROPHILS NFR BLD AUTO: 65.5 % — SIGNIFICANT CHANGE UP (ref 37–73)
PLAT MORPH BLD: NORMAL — SIGNIFICANT CHANGE UP
PLATELET # BLD AUTO: 266 K/UL — SIGNIFICANT CHANGE UP (ref 150–400)
POIKILOCYTOSIS BLD QL AUTO: SLIGHT — SIGNIFICANT CHANGE UP
POTASSIUM SERPL-MCNC: 4 MMOL/L — SIGNIFICANT CHANGE UP (ref 3.5–5.3)
POTASSIUM SERPL-SCNC: 4 MMOL/L — SIGNIFICANT CHANGE UP (ref 3.5–5.3)
RBC # BLD: 3.83 M/UL — LOW (ref 4.6–6.2)
RBC # FLD: 18.8 % — HIGH (ref 11–15.6)
RBC BLD AUTO: ABNORMAL
SODIUM SERPL-SCNC: 141 MMOL/L — SIGNIFICANT CHANGE UP (ref 135–145)
WBC # BLD: 6.3 K/UL — SIGNIFICANT CHANGE UP (ref 4.8–10.8)
WBC # FLD AUTO: 6.3 K/UL — SIGNIFICANT CHANGE UP (ref 4.8–10.8)

## 2019-01-19 PROCEDURE — 99232 SBSQ HOSP IP/OBS MODERATE 35: CPT

## 2019-01-19 PROCEDURE — 99233 SBSQ HOSP IP/OBS HIGH 50: CPT

## 2019-01-19 PROCEDURE — 93010 ELECTROCARDIOGRAM REPORT: CPT

## 2019-01-19 RX ORDER — LANOLIN ALCOHOL/MO/W.PET/CERES
6 CREAM (GRAM) TOPICAL AT BEDTIME
Qty: 0 | Refills: 0 | Status: DISCONTINUED | OUTPATIENT
Start: 2019-01-19 | End: 2019-01-23

## 2019-01-19 RX ADMIN — CLOPIDOGREL BISULFATE 75 MILLIGRAM(S): 75 TABLET, FILM COATED ORAL at 12:00

## 2019-01-19 RX ADMIN — Medication 30 MILLIGRAM(S): at 06:43

## 2019-01-19 RX ADMIN — Medication 50 MILLIGRAM(S): at 06:43

## 2019-01-19 RX ADMIN — PANTOPRAZOLE SODIUM 40 MILLIGRAM(S): 20 TABLET, DELAYED RELEASE ORAL at 06:43

## 2019-01-19 RX ADMIN — ATORVASTATIN CALCIUM 40 MILLIGRAM(S): 80 TABLET, FILM COATED ORAL at 21:51

## 2019-01-19 RX ADMIN — TAMSULOSIN HYDROCHLORIDE 0.4 MILLIGRAM(S): 0.4 CAPSULE ORAL at 21:51

## 2019-01-19 RX ADMIN — Medication 6 MILLIGRAM(S): at 21:51

## 2019-01-19 RX ADMIN — Medication 50 MILLIGRAM(S): at 17:32

## 2019-01-19 NOTE — PROGRESS NOTE ADULT - SUBJECTIVE AND OBJECTIVE BOX
ASAEL LUIS  [id number]        Chief Complaint: follow up SOB/anemia/CAD      Subjective: no complaints at rest, no cp/sob/palps      24 hour Tele: SR, no events        atorvastatin 40 milliGRAM(s) Oral at bedtime  clopidogrel Tablet 75 milliGRAM(s) Oral daily  metoprolol tartrate 50 milliGRAM(s) Oral two times a day  NIFEdipine XL 30 milliGRAM(s) Oral daily  pantoprazole    Tablet 40 milliGRAM(s) Oral before breakfast  tamsulosin 0.4 milliGRAM(s) Oral at bedtime          Physical Exam:  T(C): 36.6 (01-19-19 @ 06:39), Max: 37.1 (01-18-19 @ 19:41)  HR: 65 (01-19-19 @ 06:39) (59 - 78)  BP: 118/50 (01-19-19 @ 06:39) (118/50 - 160/73)  RR: 16 (01-19-19 @ 06:39) (16 - 18)  SpO2: 94% (01-19-19 @ 06:39) (94% - 100%)  Wt(kg): --  General: Comfortable in NAD  HEENT: MMM, sclera anicteric  Resp: CTA bilaterally  CVS: nl s1s2, rrr, no s3/JVD  Abd: soft, NT, ND, BS+  Ext: No c/c/e  Neuro A&O x3  Psych: Normal affect    I&O's Summary    18 Jan 2019 07:01  -  19 Jan 2019 07:00  --------------------------------------------------------  IN: 830 mL / OUT: 1075 mL / NET: -245 mL          Labs:   19 Jan 2019 07:29    141    |  106    |  15.0   ----------------------------<  82     4.0     |  22.0   |  1.30     Ca    8.9        19 Jan 2019 07:29                            6.1    6.9   )-----------( 278      ( 18 Jan 2019 12:46 )             21.7     PT/INR - ( 18 Jan 2019 12:32 )   PT: 13.4 sec;   INR: 1.16 ratio         PTT - ( 18 Jan 2019 12:32 )  PTT:33.7 sec        Assessment:  78yMale PMHX CAD s/p CABG x3 2016, old IWMI and RCA stent, LCX graft occusion, LM stent 2017 with 20% ISR summer 2018, patent RCA graft 2018, PAF, PAD, HTN, HLD here with symptomatic anemia. Plan had been for [facility] due to progressive dyspnea, but Hgb 6 on PST testing. Currently comfortable, no signs CHF and patient in SR.     Plan:  1. CV stable for EGD/colonoscopy  2. continue plavix  3. Restart A/C post procedure  4. Continue CV meds      Agustin Zhao MD

## 2019-01-19 NOTE — PROGRESS NOTE ADULT - SUBJECTIVE AND OBJECTIVE BOX
ASAEL SMITH     Chief Complaint: Patient is a 78y old  Male who presents with a chief complaint of SOB/anemia (19 Jan 2019 10:04)      PAST MEDICAL & SURGICAL HISTORY:  Hepatic cyst  Former smoker  PAF (paroxysmal atrial fibrillation)  BPH (benign prostatic hyperplasia)  Congenital kidney disease  SOB (shortness of breath): ANGINAL EQUIVALENT CLASS III  Old MI (myocardial infarction)  Coronary artery disease involving native coronary artery of native heart with angina pectoris: CABG 2016 ENG LAD, SVG TO CIRC, SVG TO RPDA  PCIs  Pure hypercholesterolemia  Essential hypertension  S/P CABG x 3: [facility] sept. 2016 ENG LAD, SVG to circ, SVG to RPDA  CAD S/P percutaneous coronary angioplasty      HPI/OVERNIGHT EVENTS: Patient in no distress    MEDICATIONS  (STANDING):  atorvastatin 40 milliGRAM(s) Oral at bedtime  clopidogrel Tablet 75 milliGRAM(s) Oral daily  metoprolol tartrate 50 milliGRAM(s) Oral two times a day  NIFEdipine XL 30 milliGRAM(s) Oral daily  pantoprazole    Tablet 40 milliGRAM(s) Oral before breakfast  tamsulosin 0.4 milliGRAM(s) Oral at bedtime      Vital Signs Last 24 Hrs  T(C): 36.8 (19 Jan 2019 15:20), Max: 37.1 (18 Jan 2019 19:41)  T(F): 98.2 (19 Jan 2019 15:20), Max: 98.8 (18 Jan 2019 19:41)  HR: 68 (19 Jan 2019 16:26) (59 - 71)  BP: 116/54 (19 Jan 2019 16:26) (11/58 - 128/64)  BP(mean): --  RR: 18 (19 Jan 2019 15:20) (16 - 18)  SpO2: 98% (19 Jan 2019 15:20) (94% - 100%)    PHYSICAL EXAM:  HEENT: PERRLA, EOMI, Normal Hearing  Neck: No LAD, No JVD  Back: No CVA tenderness  Respiratory: CTAB Cardiovascular: S1 and S2, RRR, no M/G/R  Gastrointestinal: BS+, soft, NT/ND  Extremities: No peripheral edema  Vascular: 2+ peripheral pulses  Neurological: A/O x 3, no focal deficits        CAPILLARY BLOOD GLUCOSE    LABS:                        8.0    6.3   )-----------( 266      ( 19 Jan 2019 07:29 )             27.4     01-19    141  |  106  |  15.0  ----------------------------<  82  4.0   |  22.0  |  1.30    Ca    8.9      19 Jan 2019 07:29      PT/INR - ( 18 Jan 2019 12:32 )   PT: 13.4 sec;   INR: 1.16 ratio         PTT - ( 18 Jan 2019 12:32 )  PTT:33.7 sec      RADIOLOGY & ADDITIONAL TESTS:

## 2019-01-19 NOTE — PROGRESS NOTE ADULT - SUBJECTIVE AND OBJECTIVE BOX
Pt seen and examined. Transfused two units PRBC's yesterday with post transfusion Hb pending. No active bleeding noted.     REVIEW OF SYSTEMS:  Constitutional: No fever, weight loss or fatigue  Cardiovascular: No chest pain, palpitations, dizziness or leg swelling  Gastrointestinal: No abdominal or epigastric pain. No nausea, vomiting or hematemesis; No diarrhea or constipation. No melena or hematochezia.  Skin: No itching, burning, rashes or lesions       MEDICATIONS:  MEDICATIONS  (STANDING):  atorvastatin 40 milliGRAM(s) Oral at bedtime  clopidogrel Tablet 75 milliGRAM(s) Oral daily  metoprolol tartrate 50 milliGRAM(s) Oral two times a day  NIFEdipine XL 30 milliGRAM(s) Oral daily  pantoprazole    Tablet 40 milliGRAM(s) Oral before breakfast  tamsulosin 0.4 milliGRAM(s) Oral at bedtime    MEDICATIONS  (PRN):      Allergies    No Known Allergies    Intolerances        Vital Signs Last 24 Hrs  T(C): 36.6 (19 Jan 2019 06:39), Max: 37.1 (18 Jan 2019 19:41)  T(F): 97.9 (19 Jan 2019 06:39), Max: 98.8 (18 Jan 2019 19:41)  HR: 65 (19 Jan 2019 06:39) (59 - 78)  BP: 118/50 (19 Jan 2019 06:39) (118/50 - 160/73)  BP(mean): --  RR: 16 (19 Jan 2019 06:39) (16 - 18)  SpO2: 94% (19 Jan 2019 06:39) (94% - 100%)    01-18 @ 07:01  -  01-19 @ 07:00  --------------------------------------------------------  IN: 830 mL / OUT: 1075 mL / NET: -245 mL        PHYSICAL EXAM:    General: Well developed; well nourished; in no acute distress  HEENT: MMM, conjunctiva pink and sclera anicteric.  Lungs: clear to auscultation bilaterally.  Cor: RRR S1, S2 only.  Gastrointestinal: Abdomen: Soft non-tender non-distended; Normal bowel sounds; No hepatosplenomegaly.  Extremities: no cyanosis, clubbing or edema.  Skin: Warm and dry. No obvious rash  Neuro: Pt. a + o x 3    LABS:  CBC Full  -  ( 18 Jan 2019 12:46 )  WBC Count : 6.9 K/uL  Hemoglobin : 6.1 g/dL  Hematocrit : 21.7 %  Platelet Count - Automated : 278 K/uL  Mean Cell Volume : 68.0 fl  Mean Cell Hemoglobin : 19.1 pg  Mean Cell Hemoglobin Concentration : 28.1 g/dL  Auto Neutrophil # : x  Auto Lymphocyte # : x  Auto Monocyte # : x  Auto Eosinophil # : x  Auto Basophil # : x  Auto Neutrophil % : x  Auto Lymphocyte % : x  Auto Monocyte % : x  Auto Eosinophil % : x  Auto Basophil % : x    01-19    141  |  106  |  15.0  ----------------------------<  82  4.0   |  22.0  |  1.30    Ca    8.9      19 Jan 2019 07:29      PT/INR - ( 18 Jan 2019 12:32 )   PT: 13.4 sec;   INR: 1.16 ratio         PTT - ( 18 Jan 2019 12:32 )  PTT:33.7 sec                  RADIOLOGY & ADDITIONAL STUDIES (The following images were personally reviewed):

## 2019-01-20 DIAGNOSIS — D50.9 IRON DEFICIENCY ANEMIA, UNSPECIFIED: ICD-10-CM

## 2019-01-20 LAB
ANION GAP SERPL CALC-SCNC: 14 MMOL/L — SIGNIFICANT CHANGE UP (ref 5–17)
BUN SERPL-MCNC: 16 MG/DL — SIGNIFICANT CHANGE UP (ref 8–20)
CALCIUM SERPL-MCNC: 8.8 MG/DL — SIGNIFICANT CHANGE UP (ref 8.6–10.2)
CHLORIDE SERPL-SCNC: 107 MMOL/L — SIGNIFICANT CHANGE UP (ref 98–107)
CO2 SERPL-SCNC: 21 MMOL/L — LOW (ref 22–29)
CREAT SERPL-MCNC: 1.41 MG/DL — HIGH (ref 0.5–1.3)
FERRITIN SERPL-MCNC: 12 NG/ML — LOW (ref 30–400)
GLUCOSE SERPL-MCNC: 93 MG/DL — SIGNIFICANT CHANGE UP (ref 70–115)
HCT VFR BLD CALC: 28.8 % — LOW (ref 42–52)
HGB BLD-MCNC: 8.4 G/DL — LOW (ref 14–18)
IRON SATN MFR SERPL: 21 UG/DL — LOW (ref 59–158)
IRON SATN MFR SERPL: 5 % — LOW (ref 16–55)
MAGNESIUM SERPL-MCNC: 2.1 MG/DL — SIGNIFICANT CHANGE UP (ref 1.6–2.6)
MCHC RBC-ENTMCNC: 20.7 PG — LOW (ref 27–31)
MCHC RBC-ENTMCNC: 29.2 G/DL — LOW (ref 32–36)
MCV RBC AUTO: 71.1 FL — LOW (ref 80–94)
PHOSPHATE SERPL-MCNC: 3.3 MG/DL — SIGNIFICANT CHANGE UP (ref 2.4–4.7)
PLATELET # BLD AUTO: 282 K/UL — SIGNIFICANT CHANGE UP (ref 150–400)
POTASSIUM SERPL-MCNC: 4.3 MMOL/L — SIGNIFICANT CHANGE UP (ref 3.5–5.3)
POTASSIUM SERPL-SCNC: 4.3 MMOL/L — SIGNIFICANT CHANGE UP (ref 3.5–5.3)
RBC # BLD: 4.05 M/UL — LOW (ref 4.6–6.2)
RBC # FLD: 19.3 % — HIGH (ref 11–15.6)
SODIUM SERPL-SCNC: 142 MMOL/L — SIGNIFICANT CHANGE UP (ref 135–145)
TIBC SERPL-MCNC: 448 UG/DL — HIGH (ref 220–430)
TRANSFERRIN SERPL-MCNC: 313 MG/DL — SIGNIFICANT CHANGE UP (ref 180–329)
WBC # BLD: 7.3 K/UL — SIGNIFICANT CHANGE UP (ref 4.8–10.8)
WBC # FLD AUTO: 7.3 K/UL — SIGNIFICANT CHANGE UP (ref 4.8–10.8)

## 2019-01-20 PROCEDURE — 99233 SBSQ HOSP IP/OBS HIGH 50: CPT

## 2019-01-20 PROCEDURE — 99232 SBSQ HOSP IP/OBS MODERATE 35: CPT

## 2019-01-20 RX ORDER — IRON SUCROSE 20 MG/ML
200 INJECTION, SOLUTION INTRAVENOUS EVERY 24 HOURS
Qty: 0 | Refills: 0 | Status: DISCONTINUED | OUTPATIENT
Start: 2019-01-20 | End: 2019-01-23

## 2019-01-20 RX ADMIN — ATORVASTATIN CALCIUM 40 MILLIGRAM(S): 80 TABLET, FILM COATED ORAL at 22:06

## 2019-01-20 RX ADMIN — PANTOPRAZOLE SODIUM 40 MILLIGRAM(S): 20 TABLET, DELAYED RELEASE ORAL at 05:41

## 2019-01-20 RX ADMIN — CLOPIDOGREL BISULFATE 75 MILLIGRAM(S): 75 TABLET, FILM COATED ORAL at 11:50

## 2019-01-20 RX ADMIN — IRON SUCROSE 110 MILLIGRAM(S): 20 INJECTION, SOLUTION INTRAVENOUS at 22:05

## 2019-01-20 RX ADMIN — Medication 30 MILLIGRAM(S): at 05:41

## 2019-01-20 RX ADMIN — TAMSULOSIN HYDROCHLORIDE 0.4 MILLIGRAM(S): 0.4 CAPSULE ORAL at 22:05

## 2019-01-20 RX ADMIN — Medication 6 MILLIGRAM(S): at 22:05

## 2019-01-20 RX ADMIN — Medication 50 MILLIGRAM(S): at 05:41

## 2019-01-20 RX ADMIN — Medication 50 MILLIGRAM(S): at 18:07

## 2019-01-20 NOTE — PROGRESS NOTE ADULT - SUBJECTIVE AND OBJECTIVE BOX
ASAEL SMITH     Chief Complaint: Patient is a 78y old  Male who presents with a chief complaint of Anemia (19 Jan 2019 18:48)      PAST MEDICAL & SURGICAL HISTORY:  Hepatic cyst  Former smoker  PAF (paroxysmal atrial fibrillation)  BPH (benign prostatic hyperplasia)  Congenital kidney disease  SOB (shortness of breath): ANGINAL EQUIVALENT CLASS III  Old MI (myocardial infarction)  Coronary artery disease involving native coronary artery of native heart with angina pectoris: CABG 2016 ENG LAD, SVG TO CIRC, SVG TO RPDA  PCIs  Pure hypercholesterolemia  Essential hypertension  S/P CABG x 3: [facility] sept. 2016 ENG LAD, SVG to circ, SVG to RPDA  CAD S/P percutaneous coronary angioplasty      HPI/OVERNIGHT EVENTS: Patient is feelng well today    MEDICATIONS  (STANDING):  atorvastatin 40 milliGRAM(s) Oral at bedtime  clopidogrel Tablet 75 milliGRAM(s) Oral daily  metoprolol tartrate 50 milliGRAM(s) Oral two times a day  NIFEdipine XL 30 milliGRAM(s) Oral daily  pantoprazole    Tablet 40 milliGRAM(s) Oral before breakfast  tamsulosin 0.4 milliGRAM(s) Oral at bedtime      Vital Signs Last 24 Hrs  T(C): 36.1 (20 Jan 2019 15:02), Max: 36.8 (19 Jan 2019 21:47)  T(F): 96.9 (20 Jan 2019 15:02), Max: 98.2 (19 Jan 2019 21:47)  HR: 64 (20 Jan 2019 15:02) (60 - 64)  BP: 124/64 (20 Jan 2019 15:02) (98/57 - 136/60)  BP(mean): --  RR: 18 (20 Jan 2019 15:02) (18 - 18)  SpO2: 97% (20 Jan 2019 15:02) (95% - 98%)    PHYSICAL EXAM:  HEENT: PERRLA, EOMI, Normal Hearing  Neck: No LAD, No JVD  Back: No CVA tenderness  Respiratory: CTAB Cardiovascular: S1 and S2, RRR, no M/G/R  Gastrointestinal: BS+, soft, NT/ND  Extremities: No peripheral edema  Vascular: 2+ peripheral pulses  Neurological: A/O x 3, no focal deficits        CAPILLARY BLOOD GLUCOSE    LABS:                        8.4    7.3   )-----------( 282      ( 20 Jan 2019 06:49 )             28.8     01-20    142  |  107  |  16.0  ----------------------------<  93  4.3   |  21.0<L>  |  1.41<H>    Ca    8.8      20 Jan 2019 06:49  Phos  3.3     01-20  Mg     2.1     01-20            RADIOLOGY & ADDITIONAL TESTS:

## 2019-01-20 NOTE — PROGRESS NOTE ADULT - SUBJECTIVE AND OBJECTIVE BOX
Pt seen and examined. Alert in NAD when seen. Cardiac clearance appreciated for EGD and colonoscopy Tuesday. Iron studies noted and are c/w iron deficiency. Pt walked around the unit here today w/o exertional SOB or leg heaviness or chest pressure. Hb this AM stable at 8.4 grams    REVIEW OF SYSTEMS:  Constitutional: No fever, weight loss or fatigue  Cardiovascular: No chest pain, palpitations, dizziness or leg swelling  Gastrointestinal: No abdominal or epigastric pain. No nausea, vomiting or hematemesis; No diarrhea or constipation. No melena or hematochezia.  Skin: No itching, burning, rashes or lesions       MEDICATIONS:  MEDICATIONS  (STANDING):  atorvastatin 40 milliGRAM(s) Oral at bedtime  clopidogrel Tablet 75 milliGRAM(s) Oral daily  metoprolol tartrate 50 milliGRAM(s) Oral two times a day  NIFEdipine XL 30 milliGRAM(s) Oral daily  pantoprazole    Tablet 40 milliGRAM(s) Oral before breakfast  tamsulosin 0.4 milliGRAM(s) Oral at bedtime    MEDICATIONS  (PRN):  melatonin 6 milliGRAM(s) Oral at bedtime PRN Insomnia      Allergies    No Known Allergies    Intolerances        Vital Signs Last 24 Hrs  T(C): 36.6 (20 Jan 2019 10:44), Max: 36.8 (19 Jan 2019 15:20)  T(F): 97.9 (20 Jan 2019 10:44), Max: 98.2 (19 Jan 2019 15:20)  HR: 60 (20 Jan 2019 10:44) (60 - 68)  BP: 98/57 (20 Jan 2019 10:44) (11/58 - 136/60)  BP(mean): --  RR: 18 (20 Jan 2019 10:44) (18 - 18)  SpO2: 98% (20 Jan 2019 05:40) (95% - 98%)    01-19 @ 07:01  -  01-20 @ 07:00  --------------------------------------------------------  IN: 0 mL / OUT: 200 mL / NET: -200 mL        PHYSICAL EXAM:    General: Well developed; well nourished; in no acute distress  HEENT: MMM, conjunctiva pink and sclera anicteric.  Lungs: clear to auscultation bilaterally.  Cor: RRR S1, S2 only.  Gastrointestinal: Abdomen: Soft non-tender non-distended; Normal bowel sounds; No hepatosplenomegaly  Extremities: no cyanosis, clubbing or edema.  Skin: Warm and dry. No obvious rash  Neuro: Pt. a + o x 3    LABS:  CBC Full  -  ( 20 Jan 2019 06:49 )  WBC Count : 7.3 K/uL  Hemoglobin : 8.4 g/dL  Hematocrit : 28.8 %  Platelet Count - Automated : 282 K/uL  Mean Cell Volume : 71.1 fl  Mean Cell Hemoglobin : 20.7 pg  Mean Cell Hemoglobin Concentration : 29.2 g/dL  Auto Neutrophil # : x  Auto Lymphocyte # : x  Auto Monocyte # : x  Auto Eosinophil # : x  Auto Basophil # : x  Auto Neutrophil % : x  Auto Lymphocyte % : x  Auto Monocyte % : x  Auto Eosinophil % : x  Auto Basophil % : x    01-20    142  |  107  |  16.0  ----------------------------<  93  4.3   |  21.0<L>  |  1.41<H>    Ca    8.8      20 Jan 2019 06:49  Phos  3.3     01-20  Mg     2.1     01-20                        RADIOLOGY & ADDITIONAL STUDIES (The following images were personally reviewed):

## 2019-01-21 ENCOUNTER — TRANSCRIPTION ENCOUNTER (OUTPATIENT)
Age: 79
End: 2019-01-21

## 2019-01-21 DIAGNOSIS — I10 ESSENTIAL (PRIMARY) HYPERTENSION: ICD-10-CM

## 2019-01-21 DIAGNOSIS — Z29.9 ENCOUNTER FOR PROPHYLACTIC MEASURES, UNSPECIFIED: ICD-10-CM

## 2019-01-21 DIAGNOSIS — E78.00 PURE HYPERCHOLESTEROLEMIA, UNSPECIFIED: ICD-10-CM

## 2019-01-21 LAB
ANION GAP SERPL CALC-SCNC: 12 MMOL/L — SIGNIFICANT CHANGE UP (ref 5–17)
BUN SERPL-MCNC: 17 MG/DL — SIGNIFICANT CHANGE UP (ref 8–20)
CALCIUM SERPL-MCNC: 8.8 MG/DL — SIGNIFICANT CHANGE UP (ref 8.6–10.2)
CHLORIDE SERPL-SCNC: 104 MMOL/L — SIGNIFICANT CHANGE UP (ref 98–107)
CO2 SERPL-SCNC: 23 MMOL/L — SIGNIFICANT CHANGE UP (ref 22–29)
CREAT SERPL-MCNC: 1.25 MG/DL — SIGNIFICANT CHANGE UP (ref 0.5–1.3)
GLUCOSE SERPL-MCNC: 88 MG/DL — SIGNIFICANT CHANGE UP (ref 70–115)
HCT VFR BLD CALC: 30.4 % — LOW (ref 42–52)
HGB BLD-MCNC: 8.6 G/DL — LOW (ref 14–18)
MAGNESIUM SERPL-MCNC: 2.2 MG/DL — SIGNIFICANT CHANGE UP (ref 1.6–2.6)
MCHC RBC-ENTMCNC: 20.4 PG — LOW (ref 27–31)
MCHC RBC-ENTMCNC: 28.3 G/DL — LOW (ref 32–36)
MCV RBC AUTO: 72 FL — LOW (ref 80–94)
PHOSPHATE SERPL-MCNC: 3.4 MG/DL — SIGNIFICANT CHANGE UP (ref 2.4–4.7)
PLATELET # BLD AUTO: 297 K/UL — SIGNIFICANT CHANGE UP (ref 150–400)
POTASSIUM SERPL-MCNC: 3.9 MMOL/L — SIGNIFICANT CHANGE UP (ref 3.5–5.3)
POTASSIUM SERPL-SCNC: 3.9 MMOL/L — SIGNIFICANT CHANGE UP (ref 3.5–5.3)
RBC # BLD: 4.22 M/UL — LOW (ref 4.6–6.2)
RBC # FLD: 20 % — HIGH (ref 11–15.6)
SODIUM SERPL-SCNC: 139 MMOL/L — SIGNIFICANT CHANGE UP (ref 135–145)
WBC # BLD: 7.1 K/UL — SIGNIFICANT CHANGE UP (ref 4.8–10.8)
WBC # FLD AUTO: 7.1 K/UL — SIGNIFICANT CHANGE UP (ref 4.8–10.8)

## 2019-01-21 PROCEDURE — 99233 SBSQ HOSP IP/OBS HIGH 50: CPT

## 2019-01-21 PROCEDURE — 99232 SBSQ HOSP IP/OBS MODERATE 35: CPT

## 2019-01-21 RX ORDER — SOD SULF/SODIUM/NAHCO3/KCL/PEG
4000 SOLUTION, RECONSTITUTED, ORAL ORAL ONCE
Qty: 0 | Refills: 0 | Status: COMPLETED | OUTPATIENT
Start: 2019-01-21 | End: 2019-01-21

## 2019-01-21 RX ORDER — SOD SULF/SODIUM/NAHCO3/KCL/PEG
1000 SOLUTION, RECONSTITUTED, ORAL ORAL EVERY 4 HOURS
Qty: 0 | Refills: 0 | Status: COMPLETED | OUTPATIENT
Start: 2019-01-21 | End: 2019-01-21

## 2019-01-21 RX ADMIN — TAMSULOSIN HYDROCHLORIDE 0.4 MILLIGRAM(S): 0.4 CAPSULE ORAL at 21:48

## 2019-01-21 RX ADMIN — Medication 50 MILLIGRAM(S): at 17:04

## 2019-01-21 RX ADMIN — Medication 1000 MILLILITER(S): at 21:49

## 2019-01-21 RX ADMIN — PANTOPRAZOLE SODIUM 40 MILLIGRAM(S): 20 TABLET, DELAYED RELEASE ORAL at 05:41

## 2019-01-21 RX ADMIN — Medication 30 MILLIGRAM(S): at 05:40

## 2019-01-21 RX ADMIN — Medication 50 MILLIGRAM(S): at 05:40

## 2019-01-21 RX ADMIN — ATORVASTATIN CALCIUM 40 MILLIGRAM(S): 80 TABLET, FILM COATED ORAL at 21:49

## 2019-01-21 RX ADMIN — IRON SUCROSE 110 MILLIGRAM(S): 20 INJECTION, SOLUTION INTRAVENOUS at 21:48

## 2019-01-21 RX ADMIN — CLOPIDOGREL BISULFATE 75 MILLIGRAM(S): 75 TABLET, FILM COATED ORAL at 09:00

## 2019-01-21 RX ADMIN — Medication 1000 MILLILITER(S): at 17:38

## 2019-01-21 NOTE — PROGRESS NOTE ADULT - SUBJECTIVE AND OBJECTIVE BOX
INTERVAL HPI/OVERNIGHT EVENTS: No signs of overt bleeding. Has iron def anemia. Cleared by cardiology for EGD and colonoscopy tomorrow.     ROS wnl     PAST MEDICAL & SURGICAL HISTORY:  Hepatic cyst  Former smoker  PAF (paroxysmal atrial fibrillation)  BPH (benign prostatic hyperplasia)  Congenital kidney disease  SOB (shortness of breath): ANGINAL EQUIVALENT CLASS III  Old MI (myocardial infarction)  Coronary artery disease involving native coronary artery of native heart with angina pectoris: CABG 2016 ENG LAD, SVG TO CIRC, SVG TO RPDA  PCIs  Pure hypercholesterolemia  Essential hypertension  S/P CABG x 3: [facility] sept. 2016 ENG LAD, SVG to circ, SVG to RPDA  CAD S/P percutaneous coronary angioplasty      Home Medications:  Eliquis 5 mg oral tablet: 1 tab(s) orally 2 times a day (18 Jan 2019 15:48)  NIFEdipine 30 mg oral tablet, extended release: 1 tab(s) orally once a day (18 Jan 2019 15:48)  valsartan 320 mg oral tablet: 1 tab(s) orally once a day (18 Jan 2019 11:05)      MEDICATIONS  (STANDING):  atorvastatin 40 milliGRAM(s) Oral at bedtime  clopidogrel Tablet 75 milliGRAM(s) Oral daily  iron sucrose IVPB 200 milliGRAM(s) IV Intermittent every 24 hours  metoprolol tartrate 50 milliGRAM(s) Oral two times a day  NIFEdipine XL 30 milliGRAM(s) Oral daily  pantoprazole    Tablet 40 milliGRAM(s) Oral before breakfast  polyethylene glycol/electrolyte Solution 1000 milliLiter(s) Oral every 4 hours  tamsulosin 0.4 milliGRAM(s) Oral at bedtime    MEDICATIONS  (PRN):  melatonin 6 milliGRAM(s) Oral at bedtime PRN Insomnia      Allergies    No Known Allergies    Intolerances          PHYSICAL EXAM:   Vital Signs:  Vital Signs Last 24 Hrs  T(C): 36.6 (21 Jan 2019 05:36), Max: 36.7 (20 Jan 2019 22:08)  T(F): 97.8 (21 Jan 2019 05:36), Max: 98 (20 Jan 2019 22:08)  HR: 68 (21 Jan 2019 05:36) (60 - 68)  BP: 138/64 (21 Jan 2019 05:36) (98/57 - 138/64)  BP(mean): --  RR: 16 (21 Jan 2019 05:36) (16 - 18)  SpO2: 98% (21 Jan 2019 05:36) (96% - 98%)  Daily     Daily     GENERAL:  no distress  HEENT:  NC/AT,  anicteric  CHEST:   no increased effort, breath sounds clear  HEART:  Regular rhythm  ABDOMEN:  Soft, non-tender, non-distended, normoactive bowel sounds,  no masses ,no hepato-splenomegaly, no signs of chronic liver disease  EXTEREMITIES:  no cyanosis      LABS:                        8.4    7.3   )-----------( 282      ( 20 Jan 2019 06:49 )             28.8       Hemoglobin: 8.4 g/dL (01-20-19 @ 06:49)  Hemoglobin: 8.0 g/dL (01-19-19 @ 07:29)  Hemoglobin: 6.1 g/dL (01-18-19 @ 12:46)  Hemoglobin: 6.3 g/dL (01-18-19 @ 11:23)      01-20    142  |  107  |  16.0  ----------------------------<  93  4.3   |  21.0<L>  |  1.41<H>    Ca    8.8      20 Jan 2019 06:49  Phos  3.3     01-20  Mg     2.1     01-20        INR: 1.16 ratio (01-18-19 @ 12:32)      RADIOLOGY & ADDITIONAL TESTS:  < from: Xray Chest 2 Views PA/Lat (09.06.16 @ 09:57) >  IMPRESSION: Left and trace right pleural effusions.  Clear lungs.    < end of copied text >

## 2019-01-21 NOTE — PROGRESS NOTE ADULT - SUBJECTIVE AND OBJECTIVE BOX
ASAEL SMITH    [id number]    78y      Male    CC: Anemia    INTERVAL HPI/OVERNIGHT EVENTS:  78 year old male exertional SOB and fatigue suggestive of angina and similar to what he was experiencing when he had coronary artery stenoses.  History of Claudication , bilateral carotid stenosis  HTN former smoker , HL PAF   CAD with a remote inferior Wall MI and RCA stent , CABG x 3 2016 LCX   graft occlusion, LM stenting in 2017 .  Stress test  7/19/18 revealed  moderate intensity reversible defect in mid anteroseptal , apical wall consistent with ischemia , small moderate sized intensity defect in basal mid - inferior posterior wall consistent with infarction , EF 58% with APCs and PVCs during stress testing with exertional SOB and fatigue.  Cardiac cath 8/2018  revealed patent ENG to LAD and patent SVG to RCA.  There was only 20% LM ISR.  He was supposed to get a cath on 1/18 when he was found to have hgb of 6.1. Two units of prbc ordered and GI input appreciated. He is scheduled for endoscopy for Tuesday 1/22. Patient is stable.    today pt denies any sob no chest pain no n/v or abd pain no new events    REVIEW OF SYSTEMS:    CONSTITUTIONAL: No fever, weight loss, or fatigue  RESPIRATORY: No cough, wheezing, hemoptysis; No shortness of breath  CARDIOVASCULAR: No chest pain, palpitations  GASTROINTESTINAL: No abdominal or epigastric pain. No nausea, vomiting      Vital Signs Last 24 Hrs  T(C): 36.6 (21 Jan 2019 09:54), Max: 36.7 (20 Jan 2019 22:08)  T(F): 97.8 (21 Jan 2019 09:54), Max: 98 (20 Jan 2019 22:08)  HR: 60 (21 Jan 2019 09:54) (60 - 68)  BP: 126/60 (21 Jan 2019 09:54) (124/64 - 138/64)  BP(mean): --  RR: 17 (21 Jan 2019 09:54) (16 - 18)  SpO2: 98% (21 Jan 2019 05:36) (96% - 98%)    PHYSICAL EXAM:    GENERAL: NAD, well-groomed  HEENT: PERRL, +EOMI  CHEST/LUNG: Clear to auscultation bilaterally; No wheezing  HEART: S1S2+, Regular rate and rhythm; No murmurs  ABDOMEN: Soft, Nontender, Nondistended; Bowel sounds present  EXTREMITIES:  2+ Peripheral Pulses, No clubbing, cyanosis, or edema  SKIN: No rashes or lesions      LABS:                        8.6    7.1   )-----------( 297      ( 21 Jan 2019 06:55 )             30.4     01-21    139  |  104  |  17.0  ----------------------------<  88  3.9   |  23.0  |  1.25    Ca    8.8      21 Jan 2019 06:55  Phos  3.4     01-21  Mg     2.2     01-21              MEDICATIONS  (STANDING):  atorvastatin 40 milliGRAM(s) Oral at bedtime  clopidogrel Tablet 75 milliGRAM(s) Oral daily  iron sucrose IVPB 200 milliGRAM(s) IV Intermittent every 24 hours  metoprolol tartrate 50 milliGRAM(s) Oral two times a day  NIFEdipine XL 30 milliGRAM(s) Oral daily  pantoprazole    Tablet 40 milliGRAM(s) Oral before breakfast  polyethylene glycol/electrolyte Solution 1000 milliLiter(s) Oral every 4 hours  tamsulosin 0.4 milliGRAM(s) Oral at bedtime    MEDICATIONS  (PRN):  melatonin 6 milliGRAM(s) Oral at bedtime PRN Insomnia

## 2019-01-22 ENCOUNTER — RESULT REVIEW (OUTPATIENT)
Age: 79
End: 2019-01-22

## 2019-01-22 LAB
ANION GAP SERPL CALC-SCNC: 15 MMOL/L — SIGNIFICANT CHANGE UP (ref 5–17)
BUN SERPL-MCNC: 13 MG/DL — SIGNIFICANT CHANGE UP (ref 8–20)
CALCIUM SERPL-MCNC: 8.9 MG/DL — SIGNIFICANT CHANGE UP (ref 8.6–10.2)
CHLORIDE SERPL-SCNC: 106 MMOL/L — SIGNIFICANT CHANGE UP (ref 98–107)
CO2 SERPL-SCNC: 22 MMOL/L — SIGNIFICANT CHANGE UP (ref 22–29)
CREAT SERPL-MCNC: 1.23 MG/DL — SIGNIFICANT CHANGE UP (ref 0.5–1.3)
GLUCOSE SERPL-MCNC: 85 MG/DL — SIGNIFICANT CHANGE UP (ref 70–115)
HCT VFR BLD CALC: 31.1 % — LOW (ref 42–52)
HGB BLD-MCNC: 8.9 G/DL — LOW (ref 14–18)
MCHC RBC-ENTMCNC: 20.6 PG — LOW (ref 27–31)
MCHC RBC-ENTMCNC: 28.6 G/DL — LOW (ref 32–36)
MCV RBC AUTO: 72.2 FL — LOW (ref 80–94)
PLATELET # BLD AUTO: 316 K/UL — SIGNIFICANT CHANGE UP (ref 150–400)
POTASSIUM SERPL-MCNC: 4 MMOL/L — SIGNIFICANT CHANGE UP (ref 3.5–5.3)
POTASSIUM SERPL-SCNC: 4 MMOL/L — SIGNIFICANT CHANGE UP (ref 3.5–5.3)
RBC # BLD: 4.31 M/UL — LOW (ref 4.6–6.2)
RBC # FLD: 21.1 % — HIGH (ref 11–15.6)
SODIUM SERPL-SCNC: 143 MMOL/L — SIGNIFICANT CHANGE UP (ref 135–145)
WBC # BLD: 7.7 K/UL — SIGNIFICANT CHANGE UP (ref 4.8–10.8)
WBC # FLD AUTO: 7.7 K/UL — SIGNIFICANT CHANGE UP (ref 4.8–10.8)

## 2019-01-22 PROCEDURE — 45380 COLONOSCOPY AND BIOPSY: CPT

## 2019-01-22 PROCEDURE — 88342 IMHCHEM/IMCYTCHM 1ST ANTB: CPT | Mod: 26

## 2019-01-22 PROCEDURE — 43239 EGD BIOPSY SINGLE/MULTIPLE: CPT | Mod: 59

## 2019-01-22 PROCEDURE — 74177 CT ABD & PELVIS W/CONTRAST: CPT | Mod: 26

## 2019-01-22 PROCEDURE — 88305 TISSUE EXAM BY PATHOLOGIST: CPT | Mod: 26

## 2019-01-22 PROCEDURE — 99232 SBSQ HOSP IP/OBS MODERATE 35: CPT

## 2019-01-22 RX ADMIN — Medication 50 MILLIGRAM(S): at 18:15

## 2019-01-22 RX ADMIN — Medication 30 MILLIGRAM(S): at 06:12

## 2019-01-22 RX ADMIN — TAMSULOSIN HYDROCHLORIDE 0.4 MILLIGRAM(S): 0.4 CAPSULE ORAL at 21:06

## 2019-01-22 RX ADMIN — Medication 50 MILLIGRAM(S): at 06:12

## 2019-01-22 RX ADMIN — IRON SUCROSE 110 MILLIGRAM(S): 20 INJECTION, SOLUTION INTRAVENOUS at 21:06

## 2019-01-22 RX ADMIN — CLOPIDOGREL BISULFATE 75 MILLIGRAM(S): 75 TABLET, FILM COATED ORAL at 18:14

## 2019-01-22 RX ADMIN — ATORVASTATIN CALCIUM 40 MILLIGRAM(S): 80 TABLET, FILM COATED ORAL at 21:06

## 2019-01-22 NOTE — PROGRESS NOTE ADULT - PROBLEM SELECTOR PLAN 3
- will discuss with cardio if planning for cardiac cath post GI work up  - c/w Plavix, statin and metoprolol

## 2019-01-22 NOTE — PROGRESS NOTE ADULT - NSHPATTENDINGPLANDISCUSS_GEN_ALL_CORE
Cardiologist
the patient and his wife
patient and answered all his questions
patient and answered all his questions

## 2019-01-22 NOTE — PROGRESS NOTE ADULT - SUBJECTIVE AND OBJECTIVE BOX
ASAEL SMITH    [id number]    78y      Male    CC: Anemia    INTERVAL HPI/OVERNIGHT EVENTS:  78 year old male exertional SOB and fatigue suggestive of angina and similar to what he was experiencing when he had coronary artery stenoses.  History of Claudication , bilateral carotid stenosis  HTN former smoker , HL PAF   CAD with a remote inferior Wall MI and RCA stent , CABG x 3 2016 LCX   graft occlusion, LM stenting in 2017 .  Stress test  7/19/18 revealed  moderate intensity reversible defect in mid anteroseptal , apical wall consistent with ischemia , small moderate sized intensity defect in basal mid - inferior posterior wall consistent with infarction , EF 58% with APCs and PVCs during stress testing with exertional SOB and fatigue.  Cardiac cath 8/2018  revealed patent ENG to LAD and patent SVG to RCA.  There was only 20% LM ISR. He was supposed to get a cath on 1/18 when he was found to have hgb of 6.1. Two units of prbc ordered and GI input appreciated. He is scheduled for endoscopy for Tuesday 1/22. Patient is stable.    today pt denies any sob no chest pain no n/v or abd pain no new events      Vital Signs Last 24 Hrs  T(C): 36.7 (22 Jan 2019 11:00), Max: 36.7 (21 Jan 2019 16:22)  T(F): 98.1 (22 Jan 2019 11:00), Max: 98.1 (21 Jan 2019 16:22)  HR: 77 (22 Jan 2019 11:00) (63 - 77)  BP: 139/56 (22 Jan 2019 11:00) (110/52 - 146/64)  BP(mean): --  RR: 16 (22 Jan 2019 11:00) (16 - 16)  SpO2: 100% (22 Jan 2019 06:08) (98% - 100%)    PHYSICAL EXAM:    GENERAL: NAD, well-groomed  HEENT: PERRL, +EOMI  CHEST/LUNG: Clear to auscultation bilaterally; No wheezing  HEART: S1S2+, Regular rate and rhythm; No murmurs  ABDOMEN: Soft, Nontender, Nondistended; Bowel sounds present  EXTREMITIES:  2+ Peripheral Pulses, No clubbing, cyanosis, or edema  SKIN: No rashes or lesions      LABS:                        8.9    7.7   )-----------( 316      ( 22 Jan 2019 08:26 )             31.1     01-22    143  |  106  |  13.0  ----------------------------<  85  4.0   |  22.0  |  1.23    Ca    8.9      22 Jan 2019 08:26  Phos  3.4     01-21  Mg     2.2     01-21        MEDICATIONS  (STANDING):  atorvastatin 40 milliGRAM(s) Oral at bedtime  clopidogrel Tablet 75 milliGRAM(s) Oral daily  iron sucrose IVPB 200 milliGRAM(s) IV Intermittent every 24 hours  metoprolol tartrate 50 milliGRAM(s) Oral two times a day  NIFEdipine XL 30 milliGRAM(s) Oral daily  pantoprazole    Tablet 40 milliGRAM(s) Oral before breakfast  tamsulosin 0.4 milliGRAM(s) Oral at bedtime    MEDICATIONS  (PRN):  melatonin 6 milliGRAM(s) Oral at bedtime PRN Insomnia

## 2019-01-22 NOTE — BRIEF OPERATIVE NOTE - PROCEDURE
<<-----Click on this checkbox to enter Procedure Colonoscopy with biopsy  01/22/2019    Active  [id number]  EGD with biopsy  01/22/2019    Active  [id number]

## 2019-01-22 NOTE — BRIEF OPERATIVE NOTE - OPERATION/FINDINGS
EGD: Hiatal hernia-4 cm. Normal esophagus. Mild gastritis. Normal duodenum. Gastric and duodenal biopsies were performed  Colonoscopy: Multiple colon polyps (about 15) noted (3 mm-1.5 cm) throughout the colon.No polypectomy performed due to being on plavix.  Diverticulosis, Internal hemorrhoids

## 2019-01-22 NOTE — PROGRESS NOTE ADULT - PROBLEM SELECTOR PLAN 1
Cardiac clearance for EGD and colonoscopy Tuesday ( Monday is a holiday). Repeat labs ordered for the AM.
Will proceed with both EGD and Colonoscopy Tuesday (tomorrow is a holiday, [place] Birth[employer]). Pt to be on clear liquids all day tomorrow with bowel prep tomorrow night. Repeat labs ordered for the AM.
- s/p two units of prbc  - for endoscopy EGD/Colonoscopy today  - GI following  - monitor cbc
- s/p two units of prbc  - for endoscopy on Tuesday 1/22  - GI following  - monitor cbc
Transfuse two units of prbc today and endoscopy on Tuesday 1/22
Transfuse two units of prbc, and repeat cbc is stable and endoscopy on Tuesday 1/22
Transfused two units of prbc, and repeat cbc is stable and endoscopy on Tuesday 1/22

## 2019-01-22 NOTE — BRIEF OPERATIVE NOTE - POST-OP DX
Colon polyps  01/22/2019    Active  Otto Torres  Diverticulosis  01/22/2019    Active  Otto Torres  Gastritis, presence of bleeding unspecified, unspecified chronicity, unspecified gastritis type  01/22/2019    Otto Hollis  Hiatal hernia  01/22/2019    Otto Hollis  Internal hemorrhoids  01/22/2019    Active  Otto Torres

## 2019-01-22 NOTE — PROGRESS NOTE ADULT - PROBLEM SELECTOR PROBLEM 2
Iron deficiency anemia, unspecified iron deficiency anemia type
Iron deficiency anemia, unspecified iron deficiency anemia type
PAF (paroxysmal atrial fibrillation)

## 2019-01-22 NOTE — PROGRESS NOTE ADULT - PROBLEM SELECTOR PLAN 2
EGD and colonoscopy tomorrow   NPO after midnight
See above management plan
- continue to hold Eliquis for now  - patient may continue Plavix  - cardio following
- continue to hold eliquis for now  - patient may continue plavix  - cardio following
Hold eliquis for now, patient may continue plavix.

## 2019-01-22 NOTE — BRIEF OPERATIVE NOTE - COMMENTS
Although multiple colon polyps were noted, but anemia can not be explained with them.  CT enterography  Needs capsule endoscopy as outpatient  Advance diet  Needs colonoscopy with polypectomy once patient can be off plavix for 5 days

## 2019-01-22 NOTE — PROGRESS NOTE ADULT - PROBLEM SELECTOR PROBLEM 1
Symptomatic anemia

## 2019-01-23 ENCOUNTER — INBOUND DOCUMENT (OUTPATIENT)
Age: 79
End: 2019-01-23

## 2019-01-23 ENCOUNTER — TRANSCRIPTION ENCOUNTER (OUTPATIENT)
Age: 79
End: 2019-01-23

## 2019-01-23 VITALS — WEIGHT: 189.82 LBS

## 2019-01-23 LAB
ANION GAP SERPL CALC-SCNC: 13 MMOL/L — SIGNIFICANT CHANGE UP (ref 5–17)
BUN SERPL-MCNC: 9 MG/DL — SIGNIFICANT CHANGE UP (ref 8–20)
CALCIUM SERPL-MCNC: 8.8 MG/DL — SIGNIFICANT CHANGE UP (ref 8.6–10.2)
CHLORIDE SERPL-SCNC: 107 MMOL/L — SIGNIFICANT CHANGE UP (ref 98–107)
CO2 SERPL-SCNC: 21 MMOL/L — LOW (ref 22–29)
CREAT SERPL-MCNC: 1.17 MG/DL — SIGNIFICANT CHANGE UP (ref 0.5–1.3)
GLUCOSE SERPL-MCNC: 85 MG/DL — SIGNIFICANT CHANGE UP (ref 70–115)
HCT VFR BLD CALC: 31.2 % — LOW (ref 42–52)
HGB BLD-MCNC: 8.8 G/DL — LOW (ref 14–18)
MCHC RBC-ENTMCNC: 20.6 PG — LOW (ref 27–31)
MCHC RBC-ENTMCNC: 28.2 G/DL — LOW (ref 32–36)
MCV RBC AUTO: 72.9 FL — LOW (ref 80–94)
PLATELET # BLD AUTO: 328 K/UL — SIGNIFICANT CHANGE UP (ref 150–400)
POTASSIUM SERPL-MCNC: 3.8 MMOL/L — SIGNIFICANT CHANGE UP (ref 3.5–5.3)
POTASSIUM SERPL-SCNC: 3.8 MMOL/L — SIGNIFICANT CHANGE UP (ref 3.5–5.3)
RBC # BLD: 4.28 M/UL — LOW (ref 4.6–6.2)
RBC # FLD: 21.7 % — HIGH (ref 11–15.6)
SODIUM SERPL-SCNC: 141 MMOL/L — SIGNIFICANT CHANGE UP (ref 135–145)
WBC # BLD: 10.1 K/UL — SIGNIFICANT CHANGE UP (ref 4.8–10.8)
WBC # FLD AUTO: 10.1 K/UL — SIGNIFICANT CHANGE UP (ref 4.8–10.8)

## 2019-01-23 PROCEDURE — 74177 CT ABD & PELVIS W/CONTRAST: CPT

## 2019-01-23 PROCEDURE — 85027 COMPLETE CBC AUTOMATED: CPT

## 2019-01-23 PROCEDURE — 84100 ASSAY OF PHOSPHORUS: CPT

## 2019-01-23 PROCEDURE — 83550 IRON BINDING TEST: CPT

## 2019-01-23 PROCEDURE — 88305 TISSUE EXAM BY PATHOLOGIST: CPT

## 2019-01-23 PROCEDURE — P9016: CPT

## 2019-01-23 PROCEDURE — 82728 ASSAY OF FERRITIN: CPT

## 2019-01-23 PROCEDURE — 86901 BLOOD TYPING SEROLOGIC RH(D): CPT

## 2019-01-23 PROCEDURE — 99232 SBSQ HOSP IP/OBS MODERATE 35: CPT

## 2019-01-23 PROCEDURE — 86923 COMPATIBILITY TEST ELECTRIC: CPT

## 2019-01-23 PROCEDURE — 36415 COLL VENOUS BLD VENIPUNCTURE: CPT

## 2019-01-23 PROCEDURE — 99239 HOSP IP/OBS DSCHRG MGMT >30: CPT

## 2019-01-23 PROCEDURE — 83735 ASSAY OF MAGNESIUM: CPT

## 2019-01-23 PROCEDURE — 85730 THROMBOPLASTIN TIME PARTIAL: CPT

## 2019-01-23 PROCEDURE — 86850 RBC ANTIBODY SCREEN: CPT

## 2019-01-23 PROCEDURE — 83540 ASSAY OF IRON: CPT

## 2019-01-23 PROCEDURE — 84466 ASSAY OF TRANSFERRIN: CPT

## 2019-01-23 PROCEDURE — 85610 PROTHROMBIN TIME: CPT

## 2019-01-23 PROCEDURE — 36430 TRANSFUSION BLD/BLD COMPNT: CPT

## 2019-01-23 PROCEDURE — 93005 ELECTROCARDIOGRAM TRACING: CPT

## 2019-01-23 PROCEDURE — 80048 BASIC METABOLIC PNL TOTAL CA: CPT

## 2019-01-23 PROCEDURE — 86900 BLOOD TYPING SEROLOGIC ABO: CPT

## 2019-01-23 PROCEDURE — 88342 IMHCHEM/IMCYTCHM 1ST ANTB: CPT

## 2019-01-23 RX ADMIN — PANTOPRAZOLE SODIUM 40 MILLIGRAM(S): 20 TABLET, DELAYED RELEASE ORAL at 05:29

## 2019-01-23 RX ADMIN — Medication 50 MILLIGRAM(S): at 05:29

## 2019-01-23 RX ADMIN — CLOPIDOGREL BISULFATE 75 MILLIGRAM(S): 75 TABLET, FILM COATED ORAL at 08:20

## 2019-01-23 RX ADMIN — Medication 30 MILLIGRAM(S): at 05:29

## 2019-01-23 NOTE — DISCHARGE NOTE ADULT - HOSPITAL COURSE
78 year old male exertional SOB and fatigue suggestive of angina and similar to what he was experiencing when he had coronary artery stenoses.  History of Claudication , bilateral carotid stenosis  HTN former smoker , HL PAF   CAD with a remote inferior Wall MI and RCA stent , CABG x 3 2016 LCX   graft occlusion, LM stenting in 2017 .  Stress test  7/19/18 revealed  moderate intensity reversible defect in mid anteroseptal , apical wall consistent with ischemia , small moderate sized intensity defect in basal mid - inferior posterior wall consistent with infarction , EF 58% with APCs and PVCs during stress testing with exertional SOB and fatigue.  Cardiac cath 8/2018  revealed patent ENG to LAD and patent SVG to RCA.  There was only 20% LM ISR. He was supposed to get a cath on 1/18 when he was found to have hgb of 6.1. Two units of prbc ordered and GI input appreciated. He is scheduled for endoscopy for Tuesday 1/22. Patient is stable.     Problem/Plan - 1:  ·  Problem: Symptomatic anemia.  Plan: - s/p two units of prbc  - s/p endoscopy EGD/Colonoscopy showing Hiatal hernia and multiple polyps, no biopsy done since on plavix. no source of bleed  - CT GI was done no pathology per report on small intestine  - GI follow up as outpt     on CT showed right lobe 0.8cm lung nodule  - offered to have pulmonary consult on this admission, pt declined and prefers to follow up with his PCP to have a 3-6 month repeat ct scan  - I have also given Dr Rey office number to call and make appointment   - pt understands the concern for malignancy if nodule not monitored and screened  - wife at bedside agrees to make pt follow up with PCP and a pulmonary for monitoring and repeat CT scan in 3-6 months     Problem/Plan - 2:  ·  Problem: PAF (paroxysmal atrial fibrillation).  Plan: - resume Eliquis since GI work up is negative and h/h stable  - continue Plavix  - cardio follow up as outpt, cleared by cardio for d/c     Problem/Plan - 3:  ·  Problem: Coronary artery disease involving native coronary artery of native heart with angina pectoris.  Plan: hold aspirin but continue all other cardiac meds per cardio and follow up with cardio in 1-2 weeks     Problem/Plan - 4:  ·  Problem: Essential hypertension.  Plan: - c/w metoprolol and nifedipine.      Problem/Plan - 5:  ·  Problem: Pure hypercholesterolemia.  Plan: - c/w statin.     PHYSICAL EXAM:  Vital Signs Last 24 Hrs  T(C): 36.5 (23 Jan 2019 09:42), Max: 36.7 (22 Jan 2019 21:02)  T(F): 97.7 (23 Jan 2019 09:42), Max: 98.1 (22 Jan 2019 21:02)  HR: 92 (23 Jan 2019 09:42) (63 - 93)  BP: 121/70 (23 Jan 2019 09:42) (121/70 - 154/70)  BP(mean): --  RR: 17 (23 Jan 2019 09:42) (16 - 17)  SpO2: 97% (23 Jan 2019 05:25) (97% - 99%)    GENERAL: NAD, well-groomed  HEENT: PERRL, +EOMI  CHEST/LUNG: Clear to auscultation bilaterally; No wheezing  HEART: S1S2+, Regular rate and rhythm; No murmurs  ABDOMEN: Soft, Nontender, Nondistended; Bowel sounds present  EXTREMITIES:  2+ Peripheral Pulses, No clubbing, cyanosis, or edema  SKIN: No rashes or lesions    pt stable for discharge     total time spent for discharge 33 minutes

## 2019-01-23 NOTE — PROGRESS NOTE ADULT - ASSESSMENT
78 year old male exertional SOB and fatigue suggestive of angina and similar to what he was experiencing when he had coronary artery stenoses.  History of Claudication , bilateral carotid stenosis  HTN former smoker , HL PAF   CAD with a remote inferior Wall MI and RCA stent , CABG x 3 2016 LCX   graft occlusion, LM stenting in 2017 .  Stress test  7/19/18 revealed  moderate intensity reversible defect in mid anteroseptal , apical wall consistent with ischemia , small moderate sized intensity defect in basal mid - inferior posterior wall consistent with infarction , EF 58% with APCs and PVCs during stress testing with exertional SOB and fatigue.  Cardiac cath 8/2018  revealed patent ENG to LAD and patent SVG to RCA.  There was only 20% LM ISR.  He was supposed to get a cath on 1/18 when he was found to have hgb of 6.1. Two units of prbc ordered and GI input appreciated. He is scheduled for endoscopy for Tuesday 1/22.
78 year old male exertional SOB and fatigue suggestive of angina and similar to what he was experiencing when he had coronary artery stenoses.  History of Claudication , bilateral carotid stenosis  HTN former smoker , HL PAF   CAD with a remote inferior Wall MI and RCA stent , CABG x 3 2016 LCX   graft occlusion, LM stenting in 2017 .  Stress test  7/19/18 revealed  moderate intensity reversible defect in mid anteroseptal , apical wall consistent with ischemia , small moderate sized intensity defect in basal mid - inferior posterior wall consistent with infarction , EF 58% with APCs and PVCs during stress testing with exertional SOB and fatigue.  Cardiac cath 8/2018  revealed patent ENG to LAD and patent SVG to RCA.  There was only 20% LM ISR.  He was supposed to get a cath on 1/18 when he was found to have hgb of 6.1. Two units of prbc ordered and GI input appreciated. He is scheduled for endoscopy for Tuesday 1/22.  Patient is stable.
Patient with symptomatic anemia in setting of antiplatelet and anticoagulation use. No clear source noted on EGD and colonoscopy. CT enterography unrevealing.   Follow up as outpatient for capsule endoscopy  Will need colonoscopy with polypectomy, once can be off plavix  Can be discharged from GI perspective

## 2019-01-23 NOTE — DISCHARGE NOTE ADULT - ADDITIONAL INSTRUCTIONS
follow up with cardiology 1 week  follow up with GI in 1-2 weeks  follow up with your PCP to monitor and have screening of right lowet lobe 0.8cm lung nodule, need to have repeat ct scan in 3-6 months

## 2019-01-23 NOTE — DISCHARGE NOTE ADULT - SECONDARY DIAGNOSIS.
CAD S/P percutaneous coronary angioplasty BPH (benign prostatic hyperplasia) Essential hypertension PAF (paroxysmal atrial fibrillation)

## 2019-01-23 NOTE — DISCHARGE NOTE ADULT - CARE PROVIDERS DIRECT ADDRESSES
[email or web address]

## 2019-01-23 NOTE — DISCHARGE NOTE ADULT - PATIENT PORTAL LINK FT
You can access the [facility] Patient Portal, offered by [facility], by registering with the following website: [email or web address]

## 2019-01-23 NOTE — DISCHARGE NOTE ADULT - MEDICATION SUMMARY - MEDICATIONS TO TAKE
I will START or STAY ON the medications listed below when I get home from the hospital:    valsartan 320 mg oral tablet  -- 1 tab(s) by mouth once a day  -- Indication: For htn    tamsulosin 0.4 mg oral capsule  -- 1 cap(s) by mouth once a day (at bedtime)  -- Indication: For bph    Eliquis 5 mg oral tablet  -- 1 tab(s) by mouth 2 times a day  -- Indication: For afib    atorvastatin 40 mg oral tablet  -- 1 tab(s) by mouth once a day (at bedtime)  -- Indication: For hld    clopidogrel 75 mg oral tablet  -- 1 tab(s) by mouth once a day  -- Indication: For Cad    metoprolol tartrate 50 mg oral tablet  -- 1 tab(s) by mouth 2 times a day  -- Indication: For htn    NIFEdipine 30 mg oral tablet, extended release  -- 1 tab(s) by mouth once a day  -- Indication: For htn

## 2019-01-23 NOTE — PROGRESS NOTE ADULT - SUBJECTIVE AND OBJECTIVE BOX
ASAEL KERNARIANA  [id number]      Chief Complaint:  SOB/anemia/CAD    Interval History:  Patient feels very well s/p EGD/colonoscopy.  Wants to go home.  SOB much better.  Denies CP/palps.    Tele:  No events      atorvastatin 40 milliGRAM(s) Oral at bedtime  clopidogrel Tablet 75 milliGRAM(s) Oral daily  iron sucrose IVPB 200 milliGRAM(s) IV Intermittent every 24 hours  melatonin 6 milliGRAM(s) Oral at bedtime PRN  metoprolol tartrate 50 milliGRAM(s) Oral two times a day  NIFEdipine XL 30 milliGRAM(s) Oral daily  pantoprazole    Tablet 40 milliGRAM(s) Oral before breakfast  tamsulosin 0.4 milliGRAM(s) Oral at bedtime          Physical Exam:  T(C): 36.5 (01-23-19 @ 09:42), Max: 36.7 (01-22-19 @ 21:02)  HR: 92 (01-23-19 @ 09:42) (63 - 93)  BP: 121/70 (01-23-19 @ 09:42) (121/70 - 154/70)  RR: 17 (01-23-19 @ 09:42) (16 - 17)  SpO2: 97% (01-23-19 @ 05:25) (97% - 99%)  Wt(kg): --  General: Comfortable in NAD  Neck: No JVD  CVS: nl s1s2, no s3  Pulm: CTA b/l  Abd: soft, non-tender  Ext: No c/c/e  Neuro A&O x3  Psych: Normal affect      Labs:   23 Jan 2019 09:53    141    |  107    |  9.0    ----------------------------<  85     3.8     |  21.0   |  1.17     Ca    8.8        23 Jan 2019 09:53                            8.8    10.1  )-----------( 328      ( 23 Jan 2019 09:53 )             31.2           Assessment:  78yMale PMHX CAD s/p CABG x3 2016, old IWMI and RCA stent, LCX graft occusion, LM stent 2017 with 20% ISR summer 2018, patent RCA graft 2018, PAF, PAD, HTN, HLD here with symptomatic anemia. Plan had been for [facility] due to progressive dyspnea, but Hgb 6 on PST testing. Currently comfortable, no signs CHF and patient in SR.   -EGD showed hiatal hernia and mild gastritis. Colonoscopy revealed multiple colon polyps. CT enterography showed normal SB.  No obvious bleeding.  H/H now stable.  -Will hold o n [facility] for now.  Would recommend d/c ASA, continue Plavix and restart Eliquis with close OP f/u.    Plan:  1. CV stable for d/c.  2. Continue Plavix.  3. Restart Eliquis/  4. Continue other CV meds.  5. OP f/u with Dr. Piedra and GI.  Plan for OP polypectomy.    D/w Dr. Nelson.

## 2019-01-23 NOTE — DISCHARGE NOTE ADULT - OTHER SIGNIFICANT FINDINGS
CT enterography  FINDINGS:  There is an 8 mm nodule in the right lower lobe within the right   posterior costophrenic angle. The left lung base is clear. No evidence of   a pleural effusion on either side.    Cholelithiasis. The liver is unremarkable.    There is a small cyst in the superior aspect of the spleen which is   otherwise unremarkable.    The pancreatic contour is unremarkable without evidence of mass,   inflammation or ductal dilatation. The adrenal glands demonstrate normal   size and contour.    The left kidney demonstrates a single lower pole cyst. The right kidney   is essentially completely replaced by multiple large cysts occupying   almost all of the right renal parenchyma. The largest of these cysts   measures 10 cm. The right kidney measures 21 cm in length. No evidence of   right hydronephrosis.    No evidence of retroperitoneal or pelvic lymphadenopathy. The prostate,   seminal vesicles, and bladder demonstrate no abnormality.    There is a small umbilical hernia.    No small intestinal abnormality is identified on this study. No evidence   of mesenteric lymphadenopathy.    The celiac artery and SMA are patent. Inferior mesenteric artery is   patent. There is a single renal artery on its side, patent.   Atherosclerotic changes noted in the abdominal aorta without obstruction   or aneurysm.    Degenerative changes in the spine..    IMPRESSION:    8 mm right lower lobe nodule. A full diagnostic CT scan of the chest is   recommended to see if there are additional nodules.    No small bowel abnormality identified.    Cholelithiasis.    Almost complete replacement of the right kidney by innumerable large   renal cysts.    CHYNA BRAVO M.D., ATTENDING RADIOLOGIST  This document has been electronically signed. Jan 22 2019  6:47PM

## 2019-01-23 NOTE — DISCHARGE NOTE ADULT - CARE PLAN
Principal Discharge DX:	Symptomatic anemia  Goal:	stable  Assessment and plan of treatment:	follow up with GI doctor in 1-2 weeks  Secondary Diagnosis:	CAD S/P percutaneous coronary angioplasty  Assessment and plan of treatment:	stop taking aspirin, continue with plavix  Secondary Diagnosis:	BPH (benign prostatic hyperplasia)  Secondary Diagnosis:	Essential hypertension  Secondary Diagnosis:	PAF (paroxysmal atrial fibrillation)

## 2019-01-23 NOTE — PROGRESS NOTE ADULT - SUBJECTIVE AND OBJECTIVE BOX
INTERVAL HPI/OVERNIGHT EVENTS:FU for anemia. EGD showed hiatal hernia and mild gastritis. COlonoscopy revealed multiple colon polyps. CT enterography showed normal SB. No complaints. On PPI once daily    MEDICATIONS  (STANDING):  atorvastatin 40 milliGRAM(s) Oral at bedtime  clopidogrel Tablet 75 milliGRAM(s) Oral daily  iron sucrose IVPB 200 milliGRAM(s) IV Intermittent every 24 hours  metoprolol tartrate 50 milliGRAM(s) Oral two times a day  NIFEdipine XL 30 milliGRAM(s) Oral daily  pantoprazole    Tablet 40 milliGRAM(s) Oral before breakfast  tamsulosin 0.4 milliGRAM(s) Oral at bedtime    MEDICATIONS  (PRN):  melatonin 6 milliGRAM(s) Oral at bedtime PRN Insomnia      Allergies    No Known Allergies    Intolerances        Vital Signs Last 24 Hrs  T(C): 36.7 (23 Jan 2019 05:25), Max: 36.7 (22 Jan 2019 11:00)  T(F): 98.1 (23 Jan 2019 05:25), Max: 98.1 (22 Jan 2019 11:00)  HR: 93 (23 Jan 2019 05:25) (63 - 93)  BP: 140/60 (23 Jan 2019 05:25) (139/56 - 154/70)  BP(mean): --  RR: 16 (23 Jan 2019 05:25) (16 - 16)  SpO2: 97% (23 Jan 2019 05:25) (97% - 99%)    LABS:                        8.9    7.7   )-----------( 316      ( 22 Jan 2019 08:26 )             31.1     01-22    143  |  106  |  13.0  ----------------------------<  85  4.0   |  22.0  |  1.23    Ca    8.9      22 Jan 2019 08:26            RADIOLOGY & ADDITIONAL TESTS:  < from: CT Enterography w/ Oral Cont and w/ IV Cont (01.22.19 @ 17:43) >     EXAM:  CT ENTEROGRAPHY OC IC                          PROCEDURE DATE:  01/22/2019          INTERPRETATION:  CT ENTEROGRAPHY:    HISTORY:  Anemia. Negative EGD and colonoscopy..    DATE AND TIME OF EXAM: 1/22/2019 5:38 PM    TECHNIQUE:  Sections were obtained from the diaphragm to the pubic   symphysis following low dense Volumen oral and intravenous contrast.  93   mls of omnipaque 350 were administered intravenously without complication.    COMPARISON:  No prior exam.    FINDINGS:  There is an 8 mm nodule in the right lower lobe within the right   posterior costophrenic angle. The left lung base is clear. No evidence of   a pleural effusion on either side.    Cholelithiasis. The liver is unremarkable.    There is a small cyst in the superior aspect of the spleen which is   otherwise unremarkable.    The pancreatic contour is unremarkable without evidence of mass,   inflammation or ductal dilatation. The adrenal glands demonstrate normal   size and contour.    The left kidney demonstrates a single lower pole cyst. The right kidney   is essentially completely replaced by multiple large cysts occupying   almost all of the right renal parenchyma. The largest of these cysts   measures 10 cm. The right kidney measures 21 cm in length. No evidence of   right hydronephrosis.    No evidence of retroperitoneal or pelvic lymphadenopathy. The prostate,   seminal vesicles, and bladder demonstrate no abnormality.    There is a small umbilical hernia.    No small intestinal abnormality is identified on this study. No evidence   of mesenteric lymphadenopathy.    The celiac artery and SMA are patent. Inferior mesenteric artery is   patent. There is a single renal artery on its side, patent.   Atherosclerotic changes noted in the abdominal aorta without obstruction   or aneurysm.    Degenerative changes in the spine..    IMPRESSION:    8 mm right lower lobe nodule. A full diagnostic CT scan of the chest is   recommended to see if there are additional nodules.    No small bowel abnormality identified.    Cholelithiasis.    Almost complete replacement of the right kidney by innumerable large   renal cysts.    .                  CHYNA BRAVO M.D., ATTENDING RADIOLOGIST  This document has been electronically signed. Jan 22 2019  6:47PM                  < end of copied text >

## 2019-01-23 NOTE — DISCHARGE NOTE ADULT - CARE PROVIDER_API CALL
Sal Sherman), [facility]  [address] [place] [address]  Phone: [phone]  Fax: [phone]    Otto Torres), Gastroenterology; Internal Medicine  [address] [place] [address]  Phone: [phone]  Fax: [phone]    Conner Rey), Critical Care Medicine; [facility]; Pulmonary Disease; Sleep Medicine  [address] [place], [place] [id number]  Phone: [phone]  Fax: [phone]

## 2019-01-25 LAB — SURGICAL PATHOLOGY STUDY: SIGNIFICANT CHANGE UP

## 2019-02-12 ENCOUNTER — NON-APPOINTMENT (OUTPATIENT)
Age: 79
End: 2019-02-12

## 2019-02-12 ENCOUNTER — APPOINTMENT (OUTPATIENT)
Dept: CARDIOLOGY | Facility: CLINIC | Age: 79
End: 2019-02-12
Payer: MEDICARE

## 2019-02-12 VITALS
WEIGHT: 203 LBS | SYSTOLIC BLOOD PRESSURE: 105 MMHG | HEART RATE: 66 BPM | RESPIRATION RATE: 16 BRPM | BODY MASS INDEX: 28.42 KG/M2 | DIASTOLIC BLOOD PRESSURE: 70 MMHG | HEIGHT: 71 IN

## 2019-02-12 PROCEDURE — 93000 ELECTROCARDIOGRAM COMPLETE: CPT

## 2019-02-12 PROCEDURE — 99214 OFFICE O/P EST MOD 30 MIN: CPT

## 2019-02-12 RX ORDER — ASPIRIN ENTERIC COATED TABLETS 81 MG 81 MG/1
81 TABLET, DELAYED RELEASE ORAL DAILY
Qty: 90 | Refills: 3 | Status: DISCONTINUED | COMMUNITY
End: 2019-02-12

## 2019-02-12 NOTE — REASON FOR VISIT
[FreeTextEntry1] : 78 year old male exertional SOB and fatigue suggestive of angina and similar to \par what he was experiencing when he had coronary artery stenoses.  \par - History of Claudication , bilateral carotid stenosis  \par - HTN \par = former smoker , \par - HLD\par - PAF \par - CAD \par - remote inferior Wall MI and RCA stent , \par - CABG x 3 2016 LCX graft  occlusion, \par - LM stenting in 2017 . \par \par Stress test  7/19/18 revealed  moderate intensity reversible defect in mid anteroseptal , apical wall consistent with ischemia , small moderate sized intensity defect in basal mid - inferior \par posterior wall consistent with infarction , EF 58% with APCs and PVCs during \par stress testing with exertional SOB and fatigue.  \par \par Cardiac cath 8/2018  revealed patent ENG to LAD and patent SVG to RCA.  There was only 20% LM ISR. He was supposed to get a cath on 1/18 when he was found to have hgb of 6.1. \par Admitted for transfusion and workup

## 2019-02-12 NOTE — HISTORY OF PRESENT ILLNESS
[FreeTextEntry1] : s/p endoscopy EGD/Colonoscopy IMPRESSION:\par 1.  Hiatal hernia, mild gastritis and multiple colonic polyps.\par 2.  Diverticulosis.\par 3.  Internal hemorrhoids.\par No biopsy done since on plavix. no source of bleed \par - CT GI was done no pathology per report on small intestine \par - GI follow up as outpt  ? CTA, ? SB Capsule study\par \par Feels much better with resolution of exertional shortness of breath and claudication symptoms.\par He has not had GI followup\par He has not had a repeat CBC\par

## 2019-02-12 NOTE — ASSESSMENT
[FreeTextEntry1] : \par ECG: Normal sinus rhythm at 66. Nonspecific T wave flattening.\par \par Laboratory data 2/5/19:\par Cholesterol 126\par HDL 39\par LDL 68\par Creatinine 1.25\par LFTs are normal\par \par CT Abd\par The celiac artery and SMA are patent. Inferior mesenteric artery is \par patent. There is a single renal artery on its side, patent. \par Atherosclerotic changes noted in the abdominal aorta without obstruction \par or aneurysm. \par \par Exercise sestamibi stress test 7/18/18:\par Patient exercised for 5 minutes (6 minutes).\par Peak heart rate 148 (104% maximum.\par Fatigability but no chest pain.\par Blood pressure response appropriate.\par No significant ECG changes.\par SPECT findings include a small moderate sized inferior defect consistent with infarction.\par Reversible anteroseptal and apical defect consistent with ischemia.\par \par Carotid study 7/2/18\par Bilateral large plaquing of the ICA and RCA with moderate 50-69% stenoses of both plaque in the ECA noted as well.\par In comparison with a prior study there seems to be some progression.\par \par \par Echocardiogram: January 18, 2018\par Overall normal left ventricle systolic function.\par Mid inferior lateral hypokinesis.\par Left atrial dilatation\par Mild to moderate mitral regurgitation\par Plaquing of the ascending aorta\par \par Impression:\par 1. Patient had one month of exertional shortness of breath and belching suggestive of angina.Was referred for cardiac catheterization, however, subsequently found to have a hemoglobin of less than 6. Transfusion resulted in complete reversal of all symptoms. Workup thus far has been negative\par 2. Hypertension controlled with the addition of Nifedipine\par 3. Hyperlipidemia well controlled. Other labs no change\par 4. Carotid disease has progressed to moderate compared to when last assessed \par 5. There is a mild area of inferolateral hypokinesis with preserved left ventricular systolic function and no substantial valvular disease\par 6. PAF - now in sinus\par 7. PAD as described above, with stable claudication no absolute indication for intervention at this time\par \par Plan is to:\par 1. GI f/u Otto Torres), Gastroenterology Called\par 2  recheck on the blood work, May\par 3. In view of the new claudication, will need continued regular exercise\par 4. Repeat carotid study 7/19,\par 5. OV and reassess 3 month. \par \par Otto Torres), Gastroenterology; Internal Medicine \par [address] [facility] \[address] \[place] [address] \par Phone: [phone] \par Fax: [phone]

## 2019-03-05 ENCOUNTER — APPOINTMENT (OUTPATIENT)
Dept: GASTROENTEROLOGY | Facility: CLINIC | Age: 79
End: 2019-03-05
Payer: MEDICARE

## 2019-03-05 VITALS
RESPIRATION RATE: 16 BRPM | HEIGHT: 71 IN | SYSTOLIC BLOOD PRESSURE: 118 MMHG | WEIGHT: 208 LBS | HEART RATE: 74 BPM | BODY MASS INDEX: 29.12 KG/M2 | DIASTOLIC BLOOD PRESSURE: 70 MMHG | OXYGEN SATURATION: 98 %

## 2019-03-05 DIAGNOSIS — Z79.01 LONG TERM (CURRENT) USE OF ANTICOAGULANTS: ICD-10-CM

## 2019-03-05 PROCEDURE — 99215 OFFICE O/P EST HI 40 MIN: CPT

## 2019-03-05 NOTE — ASSESSMENT
[FreeTextEntry1] : I recommended to repeat labs. I also recommended to obtain capsule endoscopy to rule out any small bowel etiology. If he is still anemic, I will start iron supplementation.I describes a capsule endoscopy procedure at length. The patient is agreeable.

## 2019-03-05 NOTE — PHYSICAL EXAM
[General Appearance - Alert] : alert [General Appearance - In No Acute Distress] : in no acute distress [Sclera] : the sclera and conjunctiva were normal [PERRL With Normal Accommodation] : pupils were equal in size, round, and reactive to light [Extraocular Movements] : extraocular movements were intact [Outer Ear] : the ears and nose were normal in appearance [Oropharynx] : the oropharynx was normal [Neck Appearance] : the appearance of the neck was normal [Neck Cervical Mass (___cm)] : no neck mass was observed [Jugular Venous Distention Increased] : there was no jugular-venous distention [Thyroid Diffuse Enlargement] : the thyroid was not enlarged [Thyroid Nodule] : there were no palpable thyroid nodules [Auscultation Breath Sounds / Voice Sounds] : lungs were clear to auscultation bilaterally [Heart Rate And Rhythm] : heart rate was normal and rhythm regular [Heart Sounds] : normal S1 and S2 [Heart Sounds Gallop] : no gallops [Murmurs] : no murmurs [Heart Sounds Pericardial Friction Rub] : no pericardial rub [Full Pulse] : the pedal pulses are present [Edema] : there was no peripheral edema [Bowel Sounds] : normal bowel sounds [Abdomen Soft] : soft [Abdomen Tenderness] : non-tender [Abdomen Mass (___ Cm)] : no abdominal mass palpated [Cervical Lymph Nodes Enlarged Posterior Bilaterally] : posterior cervical [Cervical Lymph Nodes Enlarged Anterior Bilaterally] : anterior cervical [Supraclavicular Lymph Nodes Enlarged Bilaterally] : supraclavicular [Axillary Lymph Nodes Enlarged Bilaterally] : axillary [Femoral Lymph Nodes Enlarged Bilaterally] : femoral [Inguinal Lymph Nodes Enlarged Bilaterally] : inguinal [No CVA Tenderness] : no ~M costovertebral angle tenderness [No Spinal Tenderness] : no spinal tenderness [Abnormal Walk] : normal gait [Nail Clubbing] : no clubbing  or cyanosis of the fingernails [Musculoskeletal - Swelling] : no joint swelling seen [Motor Tone] : muscle strength and tone were normal [Skin Color & Pigmentation] : normal skin color and pigmentation [Skin Turgor] : normal skin turgor [] : no rash [No Focal Deficits] : no focal deficits [Oriented To Time, Place, And Person] : oriented to person, place, and time [Impaired Insight] : insight and judgment were intact [Affect] : the affect was normal

## 2019-03-05 NOTE — HISTORY OF PRESENT ILLNESS
[de-identified] : The patient arrived for post hospitalization visit. He was evaluated for iron deficiency anemia. He has been on anticoagulation and antiplatelet therapy. He underwent blood transfusion as his hemoglobin is 6. He underwent EGD and colonoscopy. On colonoscopy of multiple colon polyps. Due to being on Plavix, no polypectomy was performed. Endoscopy biopsies were negative for any celiac disease or any Helicobacter infection. CT enterography was unremarkable.He is denying any GI symptom and this time. He is feeling better. We do not have any post hospitalization labs.

## 2019-03-18 ENCOUNTER — APPOINTMENT (OUTPATIENT)
Dept: GASTROENTEROLOGY | Facility: CLINIC | Age: 79
End: 2019-03-18
Payer: MEDICARE

## 2019-03-18 PROCEDURE — 91110 GI TRC IMG INTRAL ESOPH-ILE: CPT

## 2019-04-01 ENCOUNTER — RX RENEWAL (OUTPATIENT)
Age: 79
End: 2019-04-01

## 2019-05-13 ENCOUNTER — OTHER (OUTPATIENT)
Age: 79
End: 2019-05-13

## 2019-05-31 ENCOUNTER — RX RENEWAL (OUTPATIENT)
Age: 79
End: 2019-05-31

## 2019-06-11 ENCOUNTER — APPOINTMENT (OUTPATIENT)
Dept: GASTROENTEROLOGY | Facility: CLINIC | Age: 79
End: 2019-06-11
Payer: MEDICARE

## 2019-06-11 VITALS
OXYGEN SATURATION: 98 % | HEART RATE: 65 BPM | BODY MASS INDEX: 28 KG/M2 | WEIGHT: 200 LBS | HEIGHT: 71 IN | DIASTOLIC BLOOD PRESSURE: 77 MMHG | TEMPERATURE: 97.7 F | SYSTOLIC BLOOD PRESSURE: 134 MMHG

## 2019-06-11 DIAGNOSIS — Z79.02 LONG TERM (CURRENT) USE OF ANTITHROMBOTICS/ANTIPLATELETS: ICD-10-CM

## 2019-06-11 PROCEDURE — 99214 OFFICE O/P EST MOD 30 MIN: CPT

## 2019-06-11 NOTE — PHYSICAL EXAM
[General Appearance - Alert] : alert [Sclera] : the sclera and conjunctiva were normal [General Appearance - In No Acute Distress] : in no acute distress [PERRL With Normal Accommodation] : pupils were equal in size, round, and reactive to light [Extraocular Movements] : extraocular movements were intact [Outer Ear] : the ears and nose were normal in appearance [Oropharynx] : the oropharynx was normal [Neck Appearance] : the appearance of the neck was normal [Neck Cervical Mass (___cm)] : no neck mass was observed [Jugular Venous Distention Increased] : there was no jugular-venous distention [Thyroid Diffuse Enlargement] : the thyroid was not enlarged [Thyroid Nodule] : there were no palpable thyroid nodules [Auscultation Breath Sounds / Voice Sounds] : lungs were clear to auscultation bilaterally [Heart Rate And Rhythm] : heart rate was normal and rhythm regular [Heart Sounds] : normal S1 and S2 [Heart Sounds Gallop] : no gallops [Murmurs] : no murmurs [Heart Sounds Pericardial Friction Rub] : no pericardial rub [Edema] : there was no peripheral edema [Bowel Sounds] : normal bowel sounds [Full Pulse] : the pedal pulses are present [Abdomen Tenderness] : non-tender [Abdomen Soft] : soft [Abdomen Mass (___ Cm)] : no abdominal mass palpated [Cervical Lymph Nodes Enlarged Posterior Bilaterally] : posterior cervical [Supraclavicular Lymph Nodes Enlarged Bilaterally] : supraclavicular [Cervical Lymph Nodes Enlarged Anterior Bilaterally] : anterior cervical [Axillary Lymph Nodes Enlarged Bilaterally] : axillary [Inguinal Lymph Nodes Enlarged Bilaterally] : inguinal [Femoral Lymph Nodes Enlarged Bilaterally] : femoral [No CVA Tenderness] : no ~M costovertebral angle tenderness [No Spinal Tenderness] : no spinal tenderness [Nail Clubbing] : no clubbing  or cyanosis of the fingernails [Abnormal Walk] : normal gait [Musculoskeletal - Swelling] : no joint swelling seen [Motor Tone] : muscle strength and tone were normal [Skin Color & Pigmentation] : normal skin color and pigmentation [] : no rash [Skin Turgor] : normal skin turgor [Impaired Insight] : insight and judgment were intact [No Focal Deficits] : no focal deficits [Oriented To Time, Place, And Person] : oriented to person, place, and time [Affect] : the affect was normal

## 2019-06-11 NOTE — HISTORY OF PRESENT ILLNESS
[de-identified] : The patient arrived for follow up. He was evaluated for iron deficiency anemia. He has been on anticoagulation and antiplatelet therapy. He underwent blood transfusion as his hemoglobin is 6. He underwent EGD and colonoscopy. On colonoscopy of multiple colon polyps. Due to being on Plavix, no polypectomy was performed. Endoscopy biopsies were negative for any celiac disease or any Helicobacter infection. CT enterography was unremarkable.He is denying any GI symptom and this time. He is feeling better. His last hemoglobin was around 12. Capsule endoscopy was unremarkable. Patient has no complaints. He is on aspirin 81mg and Plavix 75 mg.No recent labs

## 2019-06-11 NOTE — ASSESSMENT
[FreeTextEntry1] : I recommending to obtain the labs and followup in one month. Then we will have a cardiology followup and then then possible evaluate him for a colonoscopy with polypectomy once he can be off plavix.

## 2019-06-13 ENCOUNTER — RX RENEWAL (OUTPATIENT)
Age: 79
End: 2019-06-13

## 2019-07-09 ENCOUNTER — RX RENEWAL (OUTPATIENT)
Age: 79
End: 2019-07-09

## 2019-07-09 ENCOUNTER — MEDICATION RENEWAL (OUTPATIENT)
Age: 79
End: 2019-07-09

## 2019-08-23 ENCOUNTER — APPOINTMENT (OUTPATIENT)
Dept: GASTROENTEROLOGY | Facility: CLINIC | Age: 79
End: 2019-08-23
Payer: MEDICARE

## 2019-08-23 VITALS
WEIGHT: 198 LBS | DIASTOLIC BLOOD PRESSURE: 65 MMHG | SYSTOLIC BLOOD PRESSURE: 119 MMHG | HEIGHT: 71 IN | HEART RATE: 64 BPM | RESPIRATION RATE: 15 BRPM | OXYGEN SATURATION: 98 % | BODY MASS INDEX: 27.72 KG/M2

## 2019-08-23 DIAGNOSIS — D50.0 IRON DEFICIENCY ANEMIA SECONDARY TO BLOOD LOSS (CHRONIC): ICD-10-CM

## 2019-08-23 DIAGNOSIS — K63.5 POLYP OF COLON: ICD-10-CM

## 2019-08-23 PROCEDURE — 99214 OFFICE O/P EST MOD 30 MIN: CPT

## 2019-08-23 NOTE — HISTORY OF PRESENT ILLNESS
[Heartburn] : denies heartburn [Nausea] : denies nausea [Vomiting] : denies vomiting [Diarrhea] : denies diarrhea [Constipation] : denies constipation [Yellow Skin Or Eyes (Jaundice)] : denies jaundice [Abdominal Swelling] : denies abdominal swelling [Abdominal Pain] : denies abdominal pain [Rectal Pain] : denies rectal pain [_________] : Performed [unfilled] [Test: ___] : [unfilled] [de-identified] : ASAEL SMITH is a 78 year old male presenting today for follow up of iron deficiency anemia. He is s/p colonoscopy and endoscopy in January and was found to have multiple colon polyps which were unable to be removed due to him being on Plavix and Eliquis for cardiac stents. He states his last stent was placed 2-3 years ago. Last Hgb from March was 12.0. No recent labs. Pt states he is feeling well, no signs or symptoms of anemia.

## 2019-08-23 NOTE — ASSESSMENT
[FreeTextEntry1] : The patient presents for follow up of anemia and colon polyps. He is currently still on both Eliquis and Plavix. He has not seen his cardiologist since February. He will need to make an appointment with his cardiologist for clearance for a colonoscopy. He will need approval to hold the Eliquis for 3 days and the Plavix for 5-7 days prior to the procedure. He will call the office once clearance is obtained and will then be scheduled for a colonoscopy with polyp removal. I have discussed the indications (including but not limited to ruling out inflammatory bowel disease, colorectal neoplasm, GI bleed, and AVM's), benefits, risks  (including but not limited to reaction to the anesthesia, infection, bleeding, and perforation),  and alternatives to colonoscopy with the patient. The patient understands all options and has agreed to have a colonoscopy and is medically optimized for the planned procedure. \par He will obtain basic labs consisting of a CBC, CMP, ans PT/INR. \par He will follow up after he sees cardiology.

## 2019-08-23 NOTE — PHYSICAL EXAM
[General Appearance - Alert] : alert [General Appearance - In No Acute Distress] : in no acute distress [PERRL With Normal Accommodation] : pupils were equal in size, round, and reactive to light [Sclera] : the sclera and conjunctiva were normal [Outer Ear] : the ears and nose were normal in appearance [Extraocular Movements] : extraocular movements were intact [Neck Cervical Mass (___cm)] : no neck mass was observed [Oropharynx] : the oropharynx was normal [Neck Appearance] : the appearance of the neck was normal [Thyroid Diffuse Enlargement] : the thyroid was not enlarged [Jugular Venous Distention Increased] : there was no jugular-venous distention [Thyroid Nodule] : there were no palpable thyroid nodules [Heart Rate And Rhythm] : heart rate was normal and rhythm regular [Auscultation Breath Sounds / Voice Sounds] : lungs were clear to auscultation bilaterally [Heart Sounds Gallop] : no gallops [Heart Sounds] : normal S1 and S2 [Murmurs] : no murmurs [Heart Sounds Pericardial Friction Rub] : no pericardial rub [Abdomen Soft] : soft [Bowel Sounds] : normal bowel sounds [Abdomen Tenderness] : non-tender [Abdomen Mass (___ Cm)] : no abdominal mass palpated [Abnormal Walk] : normal gait [Nail Clubbing] : no clubbing  or cyanosis of the fingernails [Musculoskeletal - Swelling] : no joint swelling seen [Motor Tone] : muscle strength and tone were normal [Skin Color & Pigmentation] : normal skin color and pigmentation [] : no rash [Skin Turgor] : normal skin turgor [Sensation] : the sensory exam was normal to light touch and pinprick [Deep Tendon Reflexes (DTR)] : deep tendon reflexes were 2+ and symmetric [Oriented To Time, Place, And Person] : oriented to person, place, and time [No Focal Deficits] : no focal deficits [Impaired Insight] : insight and judgment were intact [Affect] : the affect was normal

## 2019-08-23 NOTE — ADDENDUM
[FreeTextEntry1] : I personally saw the patient. I agree that history, physical examination, assessment and recommendations. We will schedule him for a colonoscopy pending cardiology clearance.\par \par Otto Torres MD\par Gastroenterology \par \par

## 2019-10-07 ENCOUNTER — RX RENEWAL (OUTPATIENT)
Age: 79
End: 2019-10-07

## 2019-12-03 ENCOUNTER — MEDICATION RENEWAL (OUTPATIENT)
Age: 79
End: 2019-12-03

## 2019-12-03 ENCOUNTER — RX RENEWAL (OUTPATIENT)
Age: 79
End: 2019-12-03

## 2020-01-01 ENCOUNTER — APPOINTMENT (OUTPATIENT)
Dept: CARDIOLOGY | Facility: CLINIC | Age: 80
End: 2020-01-01
Payer: MEDICARE

## 2020-01-01 ENCOUNTER — NON-APPOINTMENT (OUTPATIENT)
Age: 80
End: 2020-01-01

## 2020-01-01 ENCOUNTER — RX RENEWAL (OUTPATIENT)
Age: 80
End: 2020-01-01

## 2020-01-01 VITALS
DIASTOLIC BLOOD PRESSURE: 60 MMHG | BODY MASS INDEX: 27.3 KG/M2 | TEMPERATURE: 98 F | HEART RATE: 73 BPM | OXYGEN SATURATION: 97 % | WEIGHT: 195 LBS | HEIGHT: 71 IN | SYSTOLIC BLOOD PRESSURE: 100 MMHG | RESPIRATION RATE: 16 BRPM

## 2020-01-01 DIAGNOSIS — Z00.00 ENCOUNTER FOR GENERAL ADULT MEDICAL EXAMINATION W/OUT ABNORMAL FINDINGS: ICD-10-CM

## 2020-01-01 LAB
ALBUMIN SERPL ELPH-MCNC: 3.9 G/DL
ALP BLD-CCNC: 89 U/L
ALT SERPL-CCNC: 6 U/L
ANION GAP SERPL CALC-SCNC: 12 MMOL/L
AST SERPL-CCNC: 11 U/L
BILIRUB SERPL-MCNC: 0.6 MG/DL
BUN SERPL-MCNC: 16 MG/DL
CALCIUM SERPL-MCNC: 9.2 MG/DL
CHLORIDE SERPL-SCNC: 103 MMOL/L
CHOLEST SERPL-MCNC: 122 MG/DL
CO2 SERPL-SCNC: 25 MMOL/L
CREAT SERPL-MCNC: 1.53 MG/DL
GLUCOSE SERPL-MCNC: 84 MG/DL
HDLC SERPL-MCNC: 42 MG/DL
LDLC SERPL CALC-MCNC: 63 MG/DL
NONHDLC SERPL-MCNC: 81 MG/DL
POTASSIUM SERPL-SCNC: 4.1 MMOL/L
PROT SERPL-MCNC: 6.9 G/DL
SODIUM SERPL-SCNC: 139 MMOL/L
TRIGL SERPL-MCNC: 85 MG/DL

## 2020-01-01 PROCEDURE — 99214 OFFICE O/P EST MOD 30 MIN: CPT

## 2020-01-01 PROCEDURE — 93000 ELECTROCARDIOGRAM COMPLETE: CPT

## 2020-01-01 PROCEDURE — 93306 TTE W/DOPPLER COMPLETE: CPT

## 2020-01-01 PROCEDURE — 93880 EXTRACRANIAL BILAT STUDY: CPT

## 2020-01-01 RX ORDER — PERFLUTREN 6.52 MG/ML
6.52 INJECTION, SUSPENSION INTRAVENOUS
Qty: 2 | Refills: 0 | Status: COMPLETED | OUTPATIENT
Start: 2020-01-01

## 2020-01-01 RX ORDER — NIFEDIPINE 60 MG/1
60 TABLET, EXTENDED RELEASE ORAL DAILY
Qty: 90 | Refills: 1 | Status: ACTIVE | COMMUNITY
Start: 2020-03-30 | End: 1900-01-01

## 2020-01-01 RX ADMIN — PERFLUTREN MG/ML: 6.52 INJECTION, SUSPENSION INTRAVENOUS at 00:00

## 2020-01-08 ENCOUNTER — APPOINTMENT (OUTPATIENT)
Dept: CARDIOLOGY | Facility: CLINIC | Age: 80
End: 2020-01-08

## 2020-03-04 RX ORDER — VALSARTAN 320 MG/1
320 TABLET, COATED ORAL
Qty: 90 | Refills: 1 | Status: DISCONTINUED | COMMUNITY
End: 2020-03-04

## 2020-03-30 ENCOUNTER — RX RENEWAL (OUTPATIENT)
Age: 80
End: 2020-03-30

## 2020-06-05 RX ORDER — METOPROLOL TARTRATE 50 MG/1
50 TABLET, FILM COATED ORAL TWICE DAILY
Qty: 180 | Refills: 2 | Status: DISCONTINUED | COMMUNITY
Start: 2019-06-13 | End: 2020-06-05

## 2020-06-08 ENCOUNTER — APPOINTMENT (OUTPATIENT)
Dept: CARDIOLOGY | Facility: CLINIC | Age: 80
End: 2020-06-08
Payer: MEDICARE

## 2020-06-08 VITALS
TEMPERATURE: 97.5 F | WEIGHT: 198 LBS | RESPIRATION RATE: 16 BRPM | SYSTOLIC BLOOD PRESSURE: 110 MMHG | BODY MASS INDEX: 27.72 KG/M2 | HEART RATE: 66 BPM | HEIGHT: 71 IN | DIASTOLIC BLOOD PRESSURE: 72 MMHG | OXYGEN SATURATION: 98 %

## 2020-06-08 PROCEDURE — 99214 OFFICE O/P EST MOD 30 MIN: CPT

## 2020-06-08 PROCEDURE — 93000 ELECTROCARDIOGRAM COMPLETE: CPT

## 2020-06-08 NOTE — REVIEW OF SYSTEMS
[Feeling Fatigued] : feeling fatigued [Urinary Frequency] : urinary frequency [Nocturia] : nocturia [Lower Back Pain] : lower back pain [Negative] : Psychiatric [Recent Weight Gain (___ Lbs)] : no recent weight gain [Fever] : no fever [Headache] : no headache [Recent Weight Loss (___ Lbs)] : no recent weight loss [Chills] : no chills [Hematuria] : no hematuria [Pain During Urination] : no dysuria

## 2020-06-08 NOTE — ASSESSMENT
[FreeTextEntry1] : \par ECG: Normal sinus rhythm at 66. Nonspecific T wave flattening.\par \par Laboratory data 2/5/19:\par Cholesterol 126\par HDL 39\par LDL 68\par Creatinine 1.25\par LFTs are normal\par \par CT Abd\par The celiac artery and SMA are patent. Inferior mesenteric artery is \par patent. There is a single renal artery on its side, patent. \par Atherosclerotic changes noted in the abdominal aorta without obstruction \par or aneurysm. \par \par Exercise sestamibi stress test 7/18/18:\par Patient exercised for 5 minutes (6 minutes).\par Peak heart rate 148 (104% maximum.\par Fatigability but no chest pain.\par Blood pressure response appropriate.\par No significant ECG changes.\par SPECT findings include a small moderate sized inferior defect consistent with infarction.\par Reversible anteroseptal and apical defect consistent with ischemia.\par \par Carotid study 7/2/18\par Bilateral large plaquing of the ICA and RCA with moderate 50-69% stenoses of both plaque in the ECA noted as well.\par In comparison with a prior study there seems to be some progression.\par \par \par Echocardiogram: January 18, 2018\par Overall normal left ventricle systolic function.\par Mid inferior lateral hypokinesis.\par Left atrial dilatation\par Mild to moderate mitral regurgitation\par Plaquing of the ascending aorta\par \par Impression:\par 1. January 2019 Patient had one month of exertional shortness of breath and belching suggestive of angina.Was     referred for cardiac catheterization, however, subsequently found to have a hemoglobin of less than 6.       Transfusion resulted in complete reversal of all symptoms. Workup thus far has been negative\par \par 2. Hypertension controlled with the addition of Nifedipine\par \par 3. Hyperlipidemia has been well controlled. Other labs no change\par \par 4. Carotid disease has progressed to moderate compared to when last assessed  7/2/18\par \par 5. There is a mild area of inferolateral hypokinesis with preserved left ventricular systolic function and no substantial valvular disease\par \par 6. PAF - now in sinus, on Eliquis and Plavix with mild subcutaneous ecchymosis\par \par 7. PAD as described above, with stable claudication no absolute indication for intervention at this time\par \par Plan is to:\par 1. GI f/u Otto Torres), Gastroenterology \par 2  recheck on the blood work soon\par 3. In view of the new claudication, will need continued regular exercise\par 4. Repeat carotid study, Echo in view of the WMA\par 5. OV and reassess 3 - 4  month. \par \par Otto Torres), Gastroenterology; Internal Medicine \par [address] [facility] \[address] \[place] [address] \par Phone: [phone] \par Fax: [phone]

## 2020-06-08 NOTE — PHYSICAL EXAM
[Normal Appearance] : normal appearance [General Appearance - Well Developed] : well developed [Well Groomed] : well groomed [General Appearance - Well Nourished] : well nourished [No Deformities] : no deformities [Normal Conjunctiva] : the conjunctiva exhibited no abnormalities [General Appearance - In No Acute Distress] : no acute distress [Eyelids - No Xanthelasma] : the eyelids demonstrated no xanthelasmas [Normal Oral Mucosa] : normal oral mucosa [No Oral Pallor] : no oral pallor [Normal Jugular Venous A Waves Present] : normal jugular venous A waves present [No Oral Cyanosis] : no oral cyanosis [No Jugular Venous Catalan A Waves] : no jugular venous catalan A waves [Normal Jugular Venous V Waves Present] : normal jugular venous V waves present [Respiration, Rhythm And Depth] : normal respiratory rhythm and effort [Exaggerated Use Of Accessory Muscles For Inspiration] : no accessory muscle use [Auscultation Breath Sounds / Voice Sounds] : lungs were clear to auscultation bilaterally [Heart Sounds] : normal S1 and S2 [Heart Rate And Rhythm] : heart rate and rhythm were normal [Murmurs] : no murmurs present [FreeTextEntry1] : Left sided carotid bruit, Diminished Pedal pulses bilaterally with left worse than right [Edema] : no peripheral edema present [Abdomen Soft] : soft [Abdomen Tenderness] : non-tender [Abdomen Mass (___ Cm)] : no abdominal mass palpated [Abnormal Walk] : normal gait [Gait - Sufficient For Exercise Testing] : the gait was sufficient for exercise testing [Nail Clubbing] : no clubbing of the fingernails [Cyanosis, Localized] : no localized cyanosis [Petechial Hemorrhages (___cm)] : no petechial hemorrhages [] : no rash [Skin Color & Pigmentation] : normal skin color and pigmentation [No Venous Stasis] : no venous stasis [No Skin Ulcers] : no skin ulcer [Skin Lesions] : no skin lesions [No Xanthoma] : no  xanthoma was observed [Affect] : the affect was normal [Oriented To Time, Place, And Person] : oriented to person, place, and time [No Anxiety] : not feeling anxious [Mood] : the mood was normal

## 2020-06-08 NOTE — REASON FOR VISIT
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 \par \par Stress test  7/19/18 revealed  moderate intensity reversible defect in mid anteroseptal , apical wall consistent with ischemia , small moderate sized intensity defect in basal mid - inferior \par posterior wall consistent with infarction , EF 58% with APCs and PVCs during \par stress testing with exertional SOB and fatigue.  \par \par Cardiac cath 8/2018  revealed patent ENG to LAD and patent SVG to RCA.  There was only 20% LM ISR. \par \par He was supposed to get a cath on 1/19 when he was found to have hgb of 6.1. \par Admitted for transfusion and workup\par No recurrent GI complaints

## 2020-06-09 LAB
BASOPHILS # BLD AUTO: 0.02 K/UL
BASOPHILS NFR BLD AUTO: 0.4 %
EOSINOPHIL # BLD AUTO: 0.1 K/UL
EOSINOPHIL NFR BLD AUTO: 1.9 %
HCT VFR BLD CALC: 44.9 %
HGB BLD-MCNC: 14.3 G/DL
IMM GRANULOCYTES NFR BLD AUTO: 0.6 %
LYMPHOCYTES # BLD AUTO: 1.68 K/UL
LYMPHOCYTES NFR BLD AUTO: 32.7 %
MAN DIFF?: NORMAL
MCHC RBC-ENTMCNC: 28.6 PG
MCHC RBC-ENTMCNC: 31.8 GM/DL
MCV RBC AUTO: 89.8 FL
MONOCYTES # BLD AUTO: 0.46 K/UL
MONOCYTES NFR BLD AUTO: 8.9 %
NEUTROPHILS # BLD AUTO: 2.85 K/UL
NEUTROPHILS NFR BLD AUTO: 55.5 %
PLATELET # BLD AUTO: 208 K/UL
RBC # BLD: 5 M/UL
RBC # FLD: 13.2 %
WBC # FLD AUTO: 5.14 K/UL

## 2020-06-10 LAB
ALBUMIN SERPL ELPH-MCNC: 4 G/DL
ALP BLD-CCNC: 63 U/L
ALT SERPL-CCNC: 14 U/L
ANION GAP SERPL CALC-SCNC: 14 MMOL/L
AST SERPL-CCNC: 23 U/L
BILIRUB SERPL-MCNC: 0.6 MG/DL
BUN SERPL-MCNC: 13 MG/DL
CALCIUM SERPL-MCNC: 9.3 MG/DL
CHLORIDE SERPL-SCNC: 105 MMOL/L
CHOLEST SERPL-MCNC: 120 MG/DL
CHOLEST/HDLC SERPL: 3.3 RATIO
CO2 SERPL-SCNC: 21 MMOL/L
CREAT SERPL-MCNC: 1.35 MG/DL
GLUCOSE SERPL-MCNC: 93 MG/DL
HDLC SERPL-MCNC: 36 MG/DL
LDLC SERPL CALC-MCNC: 66 MG/DL
POTASSIUM SERPL-SCNC: 4.9 MMOL/L
PROT SERPL-MCNC: 6.6 G/DL
PSA SERPL-MCNC: 6.06 NG/ML
SODIUM SERPL-SCNC: 140 MMOL/L
TRIGL SERPL-MCNC: 93 MG/DL
TSH SERPL-ACNC: 2.81 UIU/ML

## 2020-09-14 NOTE — PHYSICAL EXAM
[General Appearance - Well Developed] : well developed [Well Groomed] : well groomed [Normal Appearance] : normal appearance [No Deformities] : no deformities [General Appearance - Well Nourished] : well nourished [General Appearance - In No Acute Distress] : no acute distress [Normal Conjunctiva] : the conjunctiva exhibited no abnormalities [Eyelids - No Xanthelasma] : the eyelids demonstrated no xanthelasmas [Normal Oral Mucosa] : normal oral mucosa [No Oral Pallor] : no oral pallor [No Oral Cyanosis] : no oral cyanosis [Normal Jugular Venous A Waves Present] : normal jugular venous A waves present [Normal Jugular Venous V Waves Present] : normal jugular venous V waves present [No Jugular Venous Catalan A Waves] : no jugular venous catalan A waves [Exaggerated Use Of Accessory Muscles For Inspiration] : no accessory muscle use [Respiration, Rhythm And Depth] : normal respiratory rhythm and effort [Auscultation Breath Sounds / Voice Sounds] : lungs were clear to auscultation bilaterally [Heart Rate And Rhythm] : heart rate and rhythm were normal [Heart Sounds] : normal S1 and S2 [Murmurs] : no murmurs present [Edema] : no peripheral edema present [Abdomen Soft] : soft [Abdomen Tenderness] : non-tender [Abdomen Mass (___ Cm)] : no abdominal mass palpated [Abnormal Walk] : normal gait [Nail Clubbing] : no clubbing of the fingernails [Gait - Sufficient For Exercise Testing] : the gait was sufficient for exercise testing [Cyanosis, Localized] : no localized cyanosis [Petechial Hemorrhages (___cm)] : no petechial hemorrhages [Skin Color & Pigmentation] : normal skin color and pigmentation [No Venous Stasis] : no venous stasis [] : no rash [Skin Lesions] : no skin lesions [No Xanthoma] : no  xanthoma was observed [No Skin Ulcers] : no skin ulcer [Oriented To Time, Place, And Person] : oriented to person, place, and time [Mood] : the mood was normal [Affect] : the affect was normal [No Anxiety] : not feeling anxious [FreeTextEntry1] : Left sided carotid bruit, Diminished Pedal pulses bilaterally with left worse than right

## 2020-09-14 NOTE — REASON FOR VISIT
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 \par \par Stress test  7/19/18 revealed  moderate intensity reversible defect in mid anteroseptal , apical wall consistent with ischemia , small moderate sized intensity defect in basal mid - inferior \par posterior wall consistent with infarction , EF 58% with APCs and PVCs during \par stress testing with exertional SOB and fatigue.  \par \par Cardiac cath 8/2018  revealed patent ENG to LAD and patent SVG to RCA.  There was only 20% LM ISR. \par \par He was referred for a cath on 1/19 but found to have hgb of 6.1. \par Admitted for transfusion and workup\par No recurrent GI complaints

## 2020-09-14 NOTE — ASSESSMENT
[FreeTextEntry1] : Atrial fibrillation with ventricular rate of 73. R wave that K. V3. Diffuse T-wave abnormalities.\par \par \par Lab Data 2020\par Chol: 120\par HDL:   36\par LDL:   66\par Tr\par Creat: 1.35\par PSA > 6\par \par Laboratory data 19:\par Cholesterol 126\par HDL 39\par LDL 68\par Creatinine 1.25\par LFTs are normal\par \par Echocardiogram 20:\par Mild inferolateral segment of hypokinesis with overall left ventricular ejection fraction 55%\par Left atrial enlargement\par Mild mitral tricuspid insufficiency.\par Mild dilatation of the ascending aorta 4 cm.\par \par \par CT Abd\par The celiac artery and SMA are patent. Inferior mesenteric artery is \par patent. There is a single renal artery on its side, patent. \par Atherosclerotic changes noted in the abdominal aorta without obstruction \par or aneurysm. \par \par Exercise sestamibi stress test 18:\par Patient exercised for 5 minutes (6 minutes).\par Peak heart rate 148 (104% maximum.\par Fatigability but no chest pain.\par Blood pressure response appropriate.\par No significant ECG changes.\par SPECT findings include a small moderate sized inferior defect consistent with infarction.\par Reversible anteroseptal and apical defect consistent with ischemia.\par \par Carotid study 18\par Bilateral large plaquing of the ICA and RCA with moderate 50-69% stenoses of both plaque in the ECA noted as well.\par In comparison with a prior study there seems to be some progression.\par \par \par Echocardiogram: 2018\par Overall normal left ventricle systolic function.\par Mid inferior lateral hypokinesis.\par Left atrial dilatation\par Mild to moderate mitral regurgitation\par Plaquing of the ascending aorta\par \par Impression:\par 1. 2019 c/o  exertional shortness of breath and belching suggestive of angina.Was referred for cardiac catheterization, however, subsequently found to have a hemoglobin of less than 6. Transfusion resulted in complete reversal of all symptoms. Workup thus far has been negative\par \par 2. Hypertension controlled with the addition of Nifedipine\par \par 3. Hyperlipidemia has been well controlled. Other labs no change\par \par 4. Carotid disease has progressed to moderate compared to when last assessed  18\par \par 5. There is a mild area of inferolateral hypokinesis with preserved left ventricular systolic function and no substantial valvular disease\par \par 6. PAF -asymptomatic but back in AF on Eliquis and Plavix with no sequelae\par \par 7. PAD as described above, with stable claudication no absolute indication for intervention at this time\par \par Plan is to:\par 1. Refer to Urology bec of PSA\par 2  recheck on the blood work Dec '20\par 3. In view of the new claudication, will need continued regular exercise\par 4. Repeat carotid study\par 5. OV and reassess  4  month. \par \par Otto Torres), Gastroenterology; Internal Medicine \par [address] [facility] \[address] \[place] [address] \par Phone: [phone] \par Fax: [phone]

## 2020-09-14 NOTE — REVIEW OF SYSTEMS
[Urinary Frequency] : urinary frequency [Lower Back Pain] : lower back pain [Nocturia] : nocturia [Negative] : Heme/Lymph [Fever] : no fever [Headache] : no headache [Recent Weight Gain (___ Lbs)] : no recent weight gain [Chills] : no chills [Feeling Fatigued] : not feeling fatigued [Recent Weight Loss (___ Lbs)] : no recent weight loss [Hematuria] : no hematuria [Pain During Urination] : no dysuria

## 2020-09-14 NOTE — HISTORY OF PRESENT ILLNESS
[FreeTextEntry1] : s/p endoscopy EGD/Colonoscopy IMPRESSION:\par 1.  Hiatal hernia, mild gastritis and multiple colonic polyps.\par 2.  Diverticulosis.\par 3.  Internal hemorrhoids.\par No biopsy done since on plavix. no source of bleed \par - CT GI was done no pathology per report on small intestine \par - GI follow up as outpt  ? CTA, ? SB Capsule study\par \par Feels much better with resolution of exertional shortness of breath and claudication symptoms.\par He has not had GI followup\par \par Patient denies any cardiac symptoms of chest pain, shortness of breath, palpitations, PND, orthopnea or edema.\par

## 2021-01-01 ENCOUNTER — APPOINTMENT (OUTPATIENT)
Dept: CARDIOLOGY | Facility: CLINIC | Age: 81
End: 2021-01-01
Payer: MEDICARE

## 2021-01-01 ENCOUNTER — LABORATORY RESULT (OUTPATIENT)
Age: 81
End: 2021-01-01

## 2021-01-01 ENCOUNTER — NON-APPOINTMENT (OUTPATIENT)
Age: 81
End: 2021-01-01

## 2021-01-01 ENCOUNTER — RX RENEWAL (OUTPATIENT)
Age: 81
End: 2021-01-01

## 2021-01-01 VITALS
TEMPERATURE: 97.8 F | HEIGHT: 71 IN | SYSTOLIC BLOOD PRESSURE: 100 MMHG | DIASTOLIC BLOOD PRESSURE: 60 MMHG | RESPIRATION RATE: 16 BRPM | HEART RATE: 76 BPM | OXYGEN SATURATION: 97 % | BODY MASS INDEX: 25.48 KG/M2 | WEIGHT: 182 LBS

## 2021-01-01 VITALS
SYSTOLIC BLOOD PRESSURE: 115 MMHG | HEART RATE: 69 BPM | HEIGHT: 71 IN | WEIGHT: 193 LBS | TEMPERATURE: 97.4 F | DIASTOLIC BLOOD PRESSURE: 70 MMHG | RESPIRATION RATE: 16 BRPM | OXYGEN SATURATION: 96 % | BODY MASS INDEX: 27.02 KG/M2

## 2021-01-01 DIAGNOSIS — I10 ESSENTIAL (PRIMARY) HYPERTENSION: ICD-10-CM

## 2021-01-01 DIAGNOSIS — E78.5 HYPERLIPIDEMIA, UNSPECIFIED: ICD-10-CM

## 2021-01-01 DIAGNOSIS — I25.10 ATHEROSCLEROTIC HEART DISEASE OF NATIVE CORONARY ARTERY W/OUT ANGINA PECTORIS: ICD-10-CM

## 2021-01-01 DIAGNOSIS — Z95.1 PRESENCE OF AORTOCORONARY BYPASS GRAFT: ICD-10-CM

## 2021-01-01 DIAGNOSIS — R53.83 OTHER FATIGUE: ICD-10-CM

## 2021-01-01 DIAGNOSIS — R06.02 SHORTNESS OF BREATH: ICD-10-CM

## 2021-01-01 DIAGNOSIS — I65.23 OCCLUSION AND STENOSIS OF BILATERAL CAROTID ARTERIES: ICD-10-CM

## 2021-01-01 DIAGNOSIS — R07.9 CHEST PAIN, UNSPECIFIED: ICD-10-CM

## 2021-01-01 DIAGNOSIS — I48.0 PAROXYSMAL ATRIAL FIBRILLATION: ICD-10-CM

## 2021-01-01 DIAGNOSIS — I20.9 ANGINA PECTORIS, UNSPECIFIED: ICD-10-CM

## 2021-01-01 DIAGNOSIS — I73.9 PERIPHERAL VASCULAR DISEASE, UNSPECIFIED: ICD-10-CM

## 2021-01-01 LAB
ANION GAP SERPL CALC-SCNC: 12 MMOL/L
BUN SERPL-MCNC: 15 MG/DL
CALCIUM SERPL-MCNC: 9 MG/DL
CHLORIDE SERPL-SCNC: 104 MMOL/L
CO2 SERPL-SCNC: 24 MMOL/L
CREAT SERPL-MCNC: 1.48 MG/DL
GLUCOSE SERPL-MCNC: 91 MG/DL
POTASSIUM SERPL-SCNC: 4.3 MMOL/L
SODIUM SERPL-SCNC: 140 MMOL/L

## 2021-01-01 PROCEDURE — 99214 OFFICE O/P EST MOD 30 MIN: CPT

## 2021-01-01 PROCEDURE — 93000 ELECTROCARDIOGRAM COMPLETE: CPT

## 2021-01-01 RX ORDER — CLOPIDOGREL BISULFATE 75 MG/1
75 TABLET, FILM COATED ORAL DAILY
Qty: 90 | Refills: 2 | Status: ACTIVE | COMMUNITY
Start: 2021-01-01 | End: 1900-01-01

## 2021-01-01 RX ORDER — NIFEDIPINE 30 MG/1
30 TABLET, EXTENDED RELEASE ORAL DAILY
Qty: 90 | Refills: 1 | Status: ACTIVE | COMMUNITY
Start: 2021-01-01 | End: 1900-01-01

## 2021-01-01 RX ORDER — VALSARTAN 80 MG/1
80 TABLET, COATED ORAL DAILY
Qty: 90 | Refills: 3 | Status: ACTIVE | COMMUNITY
Start: 2020-03-04 | End: 1900-01-01

## 2021-01-01 RX ORDER — APIXABAN 5 MG/1
5 TABLET, FILM COATED ORAL
Qty: 180 | Refills: 3 | Status: ACTIVE | COMMUNITY
Start: 2018-10-12 | End: 1900-01-01

## 2021-01-01 RX ORDER — NIFEDIPINE 30 MG/1
30 TABLET, FILM COATED, EXTENDED RELEASE ORAL DAILY
Qty: 90 | Refills: 1 | Status: DISCONTINUED | COMMUNITY
Start: 2018-10-12 | End: 2021-01-01

## 2021-01-11 NOTE — REVIEW OF SYSTEMS
[Urinary Frequency] : urinary frequency [Nocturia] : nocturia [Lower Back Pain] : lower back pain [Negative] : Heme/Lymph [Fever] : no fever [Headache] : no headache [Recent Weight Gain (___ Lbs)] : no recent weight gain [Chills] : no chills [Feeling Fatigued] : not feeling fatigued [Recent Weight Loss (___ Lbs)] : no recent weight loss [Hematuria] : no hematuria [Pain During Urination] : no dysuria

## 2021-01-11 NOTE — HISTORY OF PRESENT ILLNESS
[FreeTextEntry1] : s/p endoscopy EGD/Colonoscopy IMPRESSION:\par 1.  Hiatal hernia, mild gastritis and multiple colonic polyps.\par 2.  Diverticulosis.\par 3.  Internal hemorrhoids.\par No biopsy done since on plavix. no source of bleed \par - CT GI was done no pathology per report on small intestine \par - GI follow up as outpt  ? CTA, ? SB Capsule study\par \par Feels well. There is no exertional shortness of breath and claudication symptoms.\par No issues with regards to his prior GI issues.\par \par Creat. from 12/24/20  = 1.53. We decreased his Valsartan to 160 mg QD and increased his Nifedipine from 30mg to 160 mg  for optimal management. \par \par Repeat Creat. 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 This will be completed over the course of 9 weeks, 5 days a week.  Unsure of radiologist name, Uro. is manged by Dr. Corcoran \par \par Patient denies any cardiac symptoms of chest pain, shortness of breath, palpitations, PND, orthopnea or edema.\par

## 2021-01-11 NOTE — ASSESSMENT
[FreeTextEntry1] : SR @ 69 bpm, diffuse non specific T wave abnormalities. \par \par Lab data 21\par Creat. 1.48 ( Valsartan  160 mg qd)\par \par Lab data 20\par Chol. 122\par Tri. 85\par HDL 42\par LDL 63\par Creat 1.53\par \par \par Lab Data 2020\par Chol: 120\par HDL:   36\par LDL:   66\par Tr\par Creat: 1.35\par PSA > 6\par \par Laboratory data 19:\par Cholesterol 126\par HDL 39\par LDL 68\par Creatinine 1.25\par LFTs are normal\par \par Echocardiogram 20:\par Mild inferolateral segment of hypokinesis with overall left ventricular ejection fraction 55%\par Left atrial enlargement\par Mild mitral tricuspid insufficiency.\par Mild dilatation of the ascending aorta 4 cm.\par \par \par CT Abd\par The celiac artery and SMA are patent. Inferior mesenteric artery is \par patent. There is a single renal artery on its side, patent. \par Atherosclerotic changes noted in the abdominal aorta without obstruction \par or aneurysm. \par \par Exercise sestamibi stress test 18:\par Patient exercised for 5 minutes (6 minutes).\par Peak heart rate 148 (104% maximum.\par Fatigability but no chest pain.\par Blood pressure response appropriate.\par No significant ECG changes.\par SPECT findings include a small moderate sized inferior defect consistent with infarction.\par Reversible anteroseptal and apical defect consistent with ischemia.\par \par Carotid study 18\par Bilateral large plaquing of the ICA and RCA with moderate 50-69% stenoses of both plaque in the ECA noted as well.\par In comparison with a prior study there seems to be some progression.\par \par \par Echocardiogram: 2018\par Overall normal left ventricle systolic function.\par Mid inferior lateral hypokinesis.\par Left atrial dilatation\par Mild to moderate mitral regurgitation\par Plaquing of the ascending aorta\par \par Impression:\par . 2019 c/o  exertional shortness of breath and belching suggestive of angina.Was referred for cardiac catheterization, however, subsequently found to have a hemoglobin of less than 6. Transfusion resulted in complete reversal of all symptoms. Workup thus far has been negative\par \par 2.  Renal fxn on Valsartan 160 bpm = 1.4  slightly better then last level but would like to see lower. HTN seems to be well controlled with Valsartan 160mg and Nifedipine 60 mg. \par \par 3. Hyperlipidemia continues to be WNL. \par \par 4. Carotid disease has progressed to moderate compared to when last assessed  18\par \par 5. There is a mild area of inferolateral hypokinesis with preserved left ventricular systolic function and no substantial valvular disease\par \par 6. HX  asymptomatic  PAF. Today SR and on AC therapy which he is tolerating\par \par 7. PAD as described above, with stable claudication no absolute indication for intervention at this time\par \par 8. Prostate CA and pending radiation therapy.\par \par Plan is to:\par 1. Although there has been improvement in his renal fxn with lowering Valsartan would like to see level closer to 1.2     Will  decrease Valsartan to 80 mg QD and instructed to monitor BPs. Knows to call with any escalations. \par \par 2. Continue all other meds as instructed. Suspect AC therapy should not be an issue during radiation. \par \par 3.   Repeat BW in 3 months. \par \par 4. In view of the new claudication, will need continued regular exercise/\par \par 5. Contninue daily walks with wife. \par \par Clinical follow up in 4 months \par \par Otto Torres), Gastroenterology; Internal Medicine \par [address] [facility] \[address] \[place] [address] \par Phone: [phone] \par Fax: [phone]

## 2021-03-03 NOTE — H&P PST ADULT - PROBLEM SELECTOR PLAN 1
KNEE PAIN         There are several common causes for knee pain. These include a sprain of the ligaments that support the joint; an injury to the cartilage lining of the joint; arthritis from wear-and-tear or inflammation; and other causes. There may also be swelling, reduced movement of the knee joint and pain with walking. A definite diagnosis was not made today. If your symptoms do not improve, further follow-up and testing may be needed.    HOME CARE:  1.  Stay off the injured leg as much as possible until pain improves.    2.  Apply an ice pack (ice cubes in a plastic bag, wrapped in a towel) over the injured area for 20 minutes every 1-2 hours the first day. Continue with ice packs 3-4 times a day for the next two days, then as needed for the relief of pain and swelling.    3. You may use acetaminophen (Tylenol) or ibuprofen (Motrin, Advil) to control pain, unless another pain medicine was prescribed. [NOTE: If you have chronic liver or kidney disease or ever had a stomach ulcer or GI bleeding, talk with your doctor before using these medicines.]    4.  If CRUTCHES or a walker have been recommended, do not bear full weight on the injured leg until you can do so without pain. Check with your doctor before returning to sports or full work duties.    5. If you have a VELCRO KNEE BRACE:    · You may open the splint to apply ice.    · You may remove the splint to bathe and sleep, unless told otherwise.    FOLLOW UP with your doctor within 1-2 weeks or as advised if not improving.      [NOTE: If X-rays were taken, they will be reviewed by a radiologist. You will be notified of any new findings that may affect your care.]      GET PROMPT MEDICAL ATTENTION if any of the following occur:  · Toes or foot becomes swollen, cold, blue, numb or tingly  · Pain or swelling increases in the knee or calf  · Warmth or redness appears over the knee or calf  · Other joints become painful  · Fever or rash appears  · Shortness of  breath or chest pain  · Fever of 100.4ºF (38ºC) or higher, or as directed by your healthcare provider    © 4840-1148 [employer]ica[employer] Batista, [address] [place] [address], [place] [address]. All rights reserved. This information is not intended as a substitute for professional medical care. Always follow your healthcare professional's instructions.     PRE-PROCEDURE ASSESSMENT  [facility]   -Patient seen and examined  -Labs reviewed  -Pre-procedure teaching completed with patient and family  -Informed consent   -Questions answered  - GFR below 60  hydration protocol   - NPO except medications with sips of water

## 2021-04-15 NOTE — PROGRESS NOTE ADULT - PROBLEM SELECTOR PROBLEM 5
Pure hypercholesterolemia Star Wedge Flap Text: The defect edges were debeveled with a #15 scalpel blade.  Given the location of the defect, shape of the defect and the proximity to free margins a star wedge flap was deemed most appropriate.  Using a sterile surgical marker, an appropriate rotation flap was drawn incorporating the defect and placing the expected incisions within the relaxed skin tension lines where possible. The area thus outlined was incised deep to adipose tissue with a #15 scalpel blade.  The skin margins were undermined to an appropriate distance in all directions utilizing iris scissors.

## 2021-05-10 PROBLEM — R53.83 FATIGUE: Status: ACTIVE | Noted: 2018-01-31

## 2021-05-10 PROBLEM — I48.0 AF (PAROXYSMAL ATRIAL FIBRILLATION): Status: ACTIVE | Noted: 2018-08-13

## 2021-05-10 PROBLEM — I65.23 BILATERAL CAROTID ARTERY STENOSIS: Status: ACTIVE | Noted: 2018-08-13

## 2021-05-10 PROBLEM — I73.9 CLAUDICATION, INTERMITTENT: Status: ACTIVE | Noted: 2018-08-13

## 2021-05-10 NOTE — HISTORY OF PRESENT ILLNESS
[FreeTextEntry1] : s/p endoscopy EGD/Colonoscopy IMPRESSION:\par 1.  Hiatal hernia, mild gastritis and multiple colonic polyps.\par 2.  Diverticulosis.\par 3.  Internal hemorrhoids.\par No biopsy done since on plavix. no source of bleed \par - CT GI was done no pathology per report on small intestine \par - GI follow up as outpt  ? CTA, ? SB Capsule study\par \par \par Creat. from 12/24/20  = 1.53. We decreased his Valsartan to 160 mg QD and increased his Nifedipine from 30mg to 160 mg  for optimal management. \par -Repeat Creat. from 1/7/21 = 1.4. Since his Valsartan was decreased to 80 mg.\par \par Recently diagnosed with low grade prostate CA and s/p 9 week course of radiation therapy..  Unsure of radiologist name, Uro. is manged by Dr. Corcoran \par \par Unintentional weight loss. There is BW which will be reviewed.\par \par Patient denies any cardiac symptoms of chest pain, shortness of breath, palpitations, PND, orthopnea or edema.\par \par

## 2021-05-10 NOTE — PHYSICAL EXAM
[FreeTextEntry1] : Left sided carotid bruit, Diminished Pedal pulses bilaterally with left worse than right [General Appearance - Well Developed] : well developed [Normal Appearance] : normal appearance [Well Groomed] : well groomed [General Appearance - Well Nourished] : well nourished [No Deformities] : no deformities [General Appearance - In No Acute Distress] : no acute distress [Normal Conjunctiva] : the conjunctiva exhibited no abnormalities [Eyelids - No Xanthelasma] : the eyelids demonstrated no xanthelasmas [Normal Oral Mucosa] : normal oral mucosa [No Oral Pallor] : no oral pallor [No Oral Cyanosis] : no oral cyanosis [Normal Jugular Venous A Waves Present] : normal jugular venous A waves present [Normal Jugular Venous V Waves Present] : normal jugular venous V waves present [No Jugular Venous Catalan A Waves] : no jugular venous catalan A waves [Respiration, Rhythm And Depth] : normal respiratory rhythm and effort [Exaggerated Use Of Accessory Muscles For Inspiration] : no accessory muscle use [Auscultation Breath Sounds / Voice Sounds] : lungs were clear to auscultation bilaterally [Heart Rate And Rhythm] : heart rate and rhythm were normal [Heart Sounds] : normal S1 and S2 [Murmurs] : no murmurs present [Edema] : no peripheral edema present [Abdomen Soft] : soft [Abdomen Tenderness] : non-tender [Abdomen Mass (___ Cm)] : no abdominal mass palpated [Abnormal Walk] : normal gait [Gait - Sufficient For Exercise Testing] : the gait was sufficient for exercise testing [Nail Clubbing] : no clubbing of the fingernails [Cyanosis, Localized] : no localized cyanosis [Petechial Hemorrhages (___cm)] : no petechial hemorrhages [Skin Color & Pigmentation] : normal skin color and pigmentation [] : no rash [No Venous Stasis] : no venous stasis [Skin Lesions] : no skin lesions [No Skin Ulcers] : no skin ulcer [No Xanthoma] : no  xanthoma was observed [Oriented To Time, Place, And Person] : oriented to person, place, and time [Affect] : the affect was normal [Mood] : the mood was normal [No Anxiety] : not feeling anxious

## 2021-05-10 NOTE — PHYSICAL EXAM
[FreeTextEntry1] : Left sided carotid bruit, Diminished Pedal pulses bilaterally with left worse than right

## 2021-05-10 NOTE — ASSESSMENT
[FreeTextEntry1] : ECG: Sinus rhythm at 76 with occasional APCs.  Low voltage P wave\par Diffuse nonspecific ST-T wave changes.\par \par Laboratory data: 4/7/2021:\par Chol    121\par HDL      39\par LDL       61\par Trig      106\par \par Creatinine 1.19; GFR 57\par \par Lab data 1/7/21\par Creat. 1.48 ( Valsartan  160 mg qd)\par \par \par Echocardiogram 9/8/20:\par Mild inferolateral segment of hypokinesis with overall left ventricular ejection fraction 55%\par Left atrial enlargement\par Mild mitral tricuspid insufficiency.\par Mild dilatation of the ascending aorta 4 cm.\par \par \par CT Abd\par The celiac artery and SMA are patent. Inferior mesenteric artery is \par patent. There is a single renal artery on its side, patent. \par Atherosclerotic changes noted in the abdominal aorta without obstruction \par or aneurysm. \par \par Exercise sestamibi stress test 7/18/18:\par Patient exercised for 5 minutes (6 minutes).\par Peak heart rate 148 (104% maximum.\par Fatigability but no chest pain.\par Blood pressure response appropriate.\par No significant ECG changes.\par SPECT findings include a small moderate sized inferior defect consistent with infarction.\par Reversible anteroseptal and apical defect consistent with ischemia.\par \par Carotid study 7/2/18\par Bilateral large plaquing of the ICA and RCA with moderate 50-69% stenoses of both plaque in the ECA noted as well.\par In comparison with a prior study there seems to be some progression.\par \par Echocardiogram: January 18, 2018\par Overall normal left ventricle systolic function.\par Mid inferior lateral hypokinesis.\par Left atrial dilatation\par Mild to moderate mitral regurgitation\par Plaquing of the ascending aorta\par \par Impression:\par 1. January 2019 c/o  exertional shortness of breath and belching suggestive of angina.Was referred for cardiac catheterization, however, subsequently found to have a hemoglobin of less than 6. Transfusion resulted in complete reversal of all symptoms. Workup for the anemia was unrevealing\par \par 2.  Renal fxn on Valsartan 80 mg has normalized controlling BPS. \par       11 lbs weight loss is also reflected in his numbers.  Possibly secondary to the 7 weeks of radiation\par \par 3. Hyperlipidemia continues to be WNL. \par \par 4. Carotid disease has progressed to moderate compared to when last assessed  7/2/18\par \par 5. There is a mild area of inferolateral hypokinesis with preserved left ventricular systolic function and no substantial valvular disease\par \par 6. HX  asymptomatic  PAF. Today SR and on AC therapy which he is tolerating. PACs not felt.\par \par 7. PAD as described above, with stable claudication no absolute indication for intervention at this time\par \par \par Plan is to:\par 1. Continue current med regimen. \par \par 2. Monitor weight loss. \par \par 3. Repeat BW in 3 months. \par \par 4. In view of the new claudication, will need continued regular exercise. \par \par \par Clinical follow up in 4 months \par

## 2021-09-03 ENCOUNTER — NON-APPOINTMENT (OUTPATIENT)
Age: 81
End: 2021-09-03

## 2021-09-17 ENCOUNTER — APPOINTMENT (OUTPATIENT)
Dept: CARDIOLOGY | Facility: CLINIC | Age: 81
End: 2021-09-17

## 2022-03-06 NOTE — REASON FOR VISIT
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 \par \par Stress test  7/19/18 revealed  moderate intensity reversible defect in mid anteroseptal , apical wall consistent with ischemia , small moderate sized intensity defect in basal mid - inferior \par posterior wall consistent with infarction , EF 58% with APCs and PVCs during \par stress testing with exertional SOB and fatigue.  \par \par Cardiac cath 8/2018 :  patent ENG to LAD and patent SVG to RCA.  There was only 20% LM ISR. \par                                      80% bilateral right and left external iliac disease\par                                      100% right SFA occlusion\par \par He was referred for a cath on 1/19 but found to have hgb of 6.1. \par Admitted for transfusion and workup\par No recurrent GI complaints
Home

## 2022-10-21 NOTE — H&P PST ADULT - RESPIRATORY
5 18 22 MILD SRF 31 DAYS - LUCENTIS AND KEEP 29 DAYS. Breath Sounds equal & clear to percussion & auscultation, no accessory muscle use

## 2023-05-17 NOTE — H&P PST ADULT - LV FUNCTION ASSESSMENT
PRE-SEDATION ASSESSMENT    CONSENT  Risks, benefits, and alternatives have been discussed with the patient/patient representative, and patient/patient representative agrees to proceed: Yes    MEDICAL HISTORY  Significant medical/surgical history: Yes  Past Complications with Sedation/Anesthesia: No  Significant Family History: No  Smoking History: Yes  Alcohol/Drug abuse: No  Possible Pregnancy (LMP): No  Cardiac History: Yes  Respiratory History: No    PHYSICAL EXAM  History and Physical Reviewed: H&P completed today  Airway Risk History: No previous complications  Airway Anatomy : Class IV  Heart : Normal  Lungs : Normal  LOC/Mental Status : Normal    OTHER FINDINGS  Reviewed current medications and allergies: Yes  Pertinent lab/diagnostic test reviewed: Yes    SEDATION RISK ASSESSMENT  Risk Status ASA: Class III - Severe systemic disease, limits activity, is not incapacitated  Plan for Sedation: Moderate Sedation  Indications for Procedure/Pre-Procedure Diagnosis and Planned Procedure: angina  EKG Monitoring: Yes    NARRATIVE FINDINGS      yes

## 2024-02-03 NOTE — ASU PATIENT PROFILE, ADULT - AS SC BRADEN ACTIVITY
Post-Op Assessment Note    CV Status:  Stable    Pain management: adequate       Mental Status:  Alert and awake   Hydration Status:  Euvolemic   PONV Controlled:  Controlled   Airway Patency:  Patent     Post Op Vitals Reviewed: Yes    No anethesia notable event occurred.    Staff: Anesthesiologist               BP      Temp      Pulse     Resp      SpO2      /100   Pulse 93   Temp (!) 96.6 °F (35.9 °C)   Resp 16   Wt 78.2 kg (172 lb 6.4 oz)   SpO2 95%      (4) walks frequently

## 2025-01-30 NOTE — CONSULT NOTE ADULT - I WAS PHYSICALLY PRESENT FOR THE KEY PORTIONS OF THE EVALUATION AND MANAGEMENT (E/M) SERVICE PROVIDED.  I AGREE WITH THE ABOVE HISTORY, PHYSICAL, AND PLAN WHICH I HAVE REVIEWED AND EDITED WHERE APPROPRIATE
----- Message from Neel Dotson MD sent at 1/30/2025 11:28 AM CST -----  Let patient know tests are ok   
----- Message from Neel Dotson MD sent at 1/30/2025 11:28 AM CST -----  Let patient know tests are ok   
Relayed providers recommendations below to José Miguel NUNN at [facility] and result faxed, verbalized understanding and had no further questions at this time.    1ST ATTEMPT made to patients son to relay results.    
Statement Selected